# Patient Record
Sex: FEMALE | Race: WHITE | NOT HISPANIC OR LATINO | Employment: FULL TIME | URBAN - METROPOLITAN AREA
[De-identification: names, ages, dates, MRNs, and addresses within clinical notes are randomized per-mention and may not be internally consistent; named-entity substitution may affect disease eponyms.]

---

## 2017-04-05 ENCOUNTER — HISTORICAL (OUTPATIENT)
Dept: RADIOLOGY | Facility: HOSPITAL | Age: 47
End: 2017-04-05

## 2017-11-16 ENCOUNTER — HISTORICAL (OUTPATIENT)
Dept: ADMINISTRATIVE | Facility: HOSPITAL | Age: 47
End: 2017-11-16

## 2017-11-16 LAB
ERYTHROCYTE [DISTWIDTH] IN BLOOD BY AUTOMATED COUNT: 13 % (ref 11.5–17)
HCT VFR BLD AUTO: 40.8 % (ref 37–47)
HGB BLD-MCNC: 12.6 GM/DL (ref 12–16)
MCH RBC QN AUTO: 29.9 PG (ref 27–31)
MCHC RBC AUTO-ENTMCNC: 30.9 GM/DL (ref 33–36)
MCV RBC AUTO: 96.9 FL (ref 80–94)
PLATELET # BLD AUTO: 203 X10(3)/MCL (ref 130–400)
PMV BLD AUTO: 10.3 FL (ref 9.4–12.4)
RBC # BLD AUTO: 4.21 X10(6)/MCL (ref 4.2–5.4)
WBC # SPEC AUTO: 6.8 X10(3)/MCL (ref 4.5–11.5)

## 2018-09-05 LAB — RAPID GROUP A STREP (OHS): NEGATIVE

## 2018-12-05 ENCOUNTER — HISTORICAL (OUTPATIENT)
Dept: RADIOLOGY | Facility: HOSPITAL | Age: 48
End: 2018-12-05

## 2019-06-02 LAB — RAPID GROUP A STREP (OHS): NEGATIVE

## 2019-09-20 LAB — RAPID GROUP A STREP (OHS): NEGATIVE

## 2019-12-09 ENCOUNTER — HISTORICAL (OUTPATIENT)
Dept: RADIOLOGY | Facility: HOSPITAL | Age: 49
End: 2019-12-09

## 2020-12-11 ENCOUNTER — HISTORICAL (OUTPATIENT)
Dept: RADIOLOGY | Facility: HOSPITAL | Age: 50
End: 2020-12-11

## 2021-12-10 LAB
INFLUENZA A ANTIGEN, POC: NEGATIVE
INFLUENZA B ANTIGEN, POC: NEGATIVE
RAPID GROUP A STREP (OHS): NEGATIVE
SARS-COV-2 RNA RESP QL NAA+PROBE: NEGATIVE

## 2022-04-11 ENCOUNTER — HISTORICAL (OUTPATIENT)
Dept: ADMINISTRATIVE | Facility: HOSPITAL | Age: 52
End: 2022-04-11

## 2022-04-24 VITALS
WEIGHT: 203.06 LBS | HEIGHT: 70 IN | OXYGEN SATURATION: 100 % | SYSTOLIC BLOOD PRESSURE: 112 MMHG | BODY MASS INDEX: 29.07 KG/M2 | DIASTOLIC BLOOD PRESSURE: 80 MMHG

## 2022-05-02 ENCOUNTER — HOSPITAL ENCOUNTER (OUTPATIENT)
Dept: RADIOLOGY | Facility: HOSPITAL | Age: 52
Discharge: HOME | End: 2022-05-02
Payer: COMMERCIAL

## 2022-05-02 DIAGNOSIS — Z12.31 ENCOUNTER FOR SCREENING MAMMOGRAM FOR MALIGNANT NEOPLASM OF BREAST: ICD-10-CM

## 2022-05-02 PROCEDURE — 77067 SCR MAMMO BI INCL CAD: CPT | Mod: TC

## 2022-05-02 PROCEDURE — 77063 BREAST TOMOSYNTHESIS BI: CPT | Mod: 26,,, | Performed by: RADIOLOGY

## 2022-05-02 PROCEDURE — 77067 MAMMO DIGITAL SCREENING BILAT WITH TOMO: ICD-10-PCS | Mod: 26,,, | Performed by: RADIOLOGY

## 2022-05-02 PROCEDURE — 77067 SCR MAMMO BI INCL CAD: CPT | Mod: 26,,, | Performed by: RADIOLOGY

## 2022-05-02 PROCEDURE — 77063 MAMMO DIGITAL SCREENING BILAT WITH TOMO: ICD-10-PCS | Mod: 26,,, | Performed by: RADIOLOGY

## 2022-05-19 ENCOUNTER — HOSPITAL ENCOUNTER (OUTPATIENT)
Dept: RADIOLOGY | Facility: HOSPITAL | Age: 52
Discharge: HOME | End: 2022-05-19
Attending: STUDENT IN AN ORGANIZED HEALTH CARE EDUCATION/TRAINING PROGRAM
Payer: COMMERCIAL

## 2022-05-19 DIAGNOSIS — R92.8 ABNORMAL MAMMOGRAM: ICD-10-CM

## 2022-05-19 PROCEDURE — 77065 DX MAMMO INCL CAD UNI: CPT | Mod: 26,RT,, | Performed by: RADIOLOGY

## 2022-05-19 PROCEDURE — 77061 MAMMO DIGITAL DIAGNOSTIC RIGHT WITH TOMO: ICD-10-PCS | Mod: 26,RT,, | Performed by: RADIOLOGY

## 2022-05-19 PROCEDURE — 76642 ULTRASOUND BREAST LIMITED: CPT | Mod: 26,RT,, | Performed by: RADIOLOGY

## 2022-05-19 PROCEDURE — 77061 BREAST TOMOSYNTHESIS UNI: CPT | Mod: 26,RT,, | Performed by: RADIOLOGY

## 2022-05-19 PROCEDURE — 77065 MAMMO DIGITAL DIAGNOSTIC RIGHT WITH TOMO: ICD-10-PCS | Mod: 26,RT,, | Performed by: RADIOLOGY

## 2022-05-19 PROCEDURE — 76642 US BREAST RIGHT LIMITED: ICD-10-PCS | Mod: 26,RT,, | Performed by: RADIOLOGY

## 2022-05-19 PROCEDURE — 76642 ULTRASOUND BREAST LIMITED: CPT | Mod: TC,RT

## 2022-05-19 PROCEDURE — 77065 DX MAMMO INCL CAD UNI: CPT | Mod: TC,RT

## 2022-05-24 ENCOUNTER — OFFICE VISIT (OUTPATIENT)
Dept: URGENT CARE | Facility: CLINIC | Age: 52
End: 2022-05-24
Payer: COMMERCIAL

## 2022-05-24 VITALS
HEART RATE: 97 BPM | RESPIRATION RATE: 18 BRPM | BODY MASS INDEX: 29.62 KG/M2 | WEIGHT: 200 LBS | SYSTOLIC BLOOD PRESSURE: 124 MMHG | OXYGEN SATURATION: 100 % | HEIGHT: 69 IN | DIASTOLIC BLOOD PRESSURE: 87 MMHG | TEMPERATURE: 99 F

## 2022-05-24 DIAGNOSIS — R05.9 COUGH: ICD-10-CM

## 2022-05-24 DIAGNOSIS — J02.9 SORE THROAT: ICD-10-CM

## 2022-05-24 DIAGNOSIS — J06.9 VIRAL URI WITH COUGH: Primary | ICD-10-CM

## 2022-05-24 LAB
CTP QC/QA: YES
FLUAV AG NPH QL: NEGATIVE
FLUBV AG NPH QL: NEGATIVE
S PYO RRNA THROAT QL PROBE: NEGATIVE
SARS-COV-2 RDRP RESP QL NAA+PROBE: NEGATIVE

## 2022-05-24 PROCEDURE — 3079F DIAST BP 80-89 MM HG: CPT | Mod: CPTII,,, | Performed by: FAMILY MEDICINE

## 2022-05-24 PROCEDURE — 3008F BODY MASS INDEX DOCD: CPT | Mod: CPTII,,, | Performed by: FAMILY MEDICINE

## 2022-05-24 PROCEDURE — 3074F SYST BP LT 130 MM HG: CPT | Mod: CPTII,,, | Performed by: FAMILY MEDICINE

## 2022-05-24 PROCEDURE — 3008F PR BODY MASS INDEX (BMI) DOCUMENTED: ICD-10-PCS | Mod: CPTII,,, | Performed by: FAMILY MEDICINE

## 2022-05-24 PROCEDURE — 96372 THER/PROPH/DIAG INJ SC/IM: CPT | Mod: ,,, | Performed by: FAMILY MEDICINE

## 2022-05-24 PROCEDURE — 99213 OFFICE O/P EST LOW 20 MIN: CPT | Mod: 25,,, | Performed by: FAMILY MEDICINE

## 2022-05-24 PROCEDURE — 3079F PR MOST RECENT DIASTOLIC BLOOD PRESSURE 80-89 MM HG: ICD-10-PCS | Mod: CPTII,,, | Performed by: FAMILY MEDICINE

## 2022-05-24 PROCEDURE — 87880 POCT RAPID STREP A: ICD-10-PCS | Mod: QW,,, | Performed by: FAMILY MEDICINE

## 2022-05-24 PROCEDURE — 1160F PR REVIEW ALL MEDS BY PRESCRIBER/CLIN PHARMACIST DOCUMENTED: ICD-10-PCS | Mod: CPTII,,, | Performed by: FAMILY MEDICINE

## 2022-05-24 PROCEDURE — U0002: ICD-10-PCS | Mod: QW,,, | Performed by: FAMILY MEDICINE

## 2022-05-24 PROCEDURE — 1159F PR MEDICATION LIST DOCUMENTED IN MEDICAL RECORD: ICD-10-PCS | Mod: CPTII,,, | Performed by: FAMILY MEDICINE

## 2022-05-24 PROCEDURE — 99213 PR OFFICE/OUTPT VISIT, EST, LEVL III, 20-29 MIN: ICD-10-PCS | Mod: 25,,, | Performed by: FAMILY MEDICINE

## 2022-05-24 PROCEDURE — 87880 STREP A ASSAY W/OPTIC: CPT | Mod: QW,,, | Performed by: FAMILY MEDICINE

## 2022-05-24 PROCEDURE — 3074F PR MOST RECENT SYSTOLIC BLOOD PRESSURE < 130 MM HG: ICD-10-PCS | Mod: CPTII,,, | Performed by: FAMILY MEDICINE

## 2022-05-24 PROCEDURE — U0002 COVID-19 LAB TEST NON-CDC: HCPCS | Mod: QW,,, | Performed by: FAMILY MEDICINE

## 2022-05-24 PROCEDURE — 87804 INFLUENZA ASSAY W/OPTIC: CPT | Mod: QW,,, | Performed by: FAMILY MEDICINE

## 2022-05-24 PROCEDURE — 96372 PR INJECTION,THERAP/PROPH/DIAG2ST, IM OR SUBCUT: ICD-10-PCS | Mod: ,,, | Performed by: FAMILY MEDICINE

## 2022-05-24 PROCEDURE — 1160F RVW MEDS BY RX/DR IN RCRD: CPT | Mod: CPTII,,, | Performed by: FAMILY MEDICINE

## 2022-05-24 PROCEDURE — 87804 POCT INFLUENZA A/B: ICD-10-PCS | Mod: 59,QW,, | Performed by: FAMILY MEDICINE

## 2022-05-24 PROCEDURE — 1159F MED LIST DOCD IN RCRD: CPT | Mod: CPTII,,, | Performed by: FAMILY MEDICINE

## 2022-05-24 RX ORDER — THYROID 60 MG/1
TABLET ORAL
COMMUNITY

## 2022-05-24 RX ORDER — BETAMETHASONE SODIUM PHOSPHATE AND BETAMETHASONE ACETATE 3; 3 MG/ML; MG/ML
6 INJECTION, SUSPENSION INTRA-ARTICULAR; INTRALESIONAL; INTRAMUSCULAR; SOFT TISSUE
Status: COMPLETED | OUTPATIENT
Start: 2022-05-24 | End: 2022-05-24

## 2022-05-24 RX ORDER — DULOXETIN HYDROCHLORIDE 60 MG/1
CAPSULE, DELAYED RELEASE ORAL
COMMUNITY
Start: 2021-12-10 | End: 2023-08-29

## 2022-05-24 RX ADMIN — BETAMETHASONE SODIUM PHOSPHATE AND BETAMETHASONE ACETATE 6 MG: 3; 3 INJECTION, SUSPENSION INTRA-ARTICULAR; INTRALESIONAL; INTRAMUSCULAR; SOFT TISSUE at 07:05

## 2022-05-24 NOTE — PROGRESS NOTES
"Subjective:       Patient ID: Swathi Belle is a 51 y.o. female.    Vitals:  height is 5' 9" (1.753 m) and weight is 90.7 kg (200 lb). Her temperature is 98.7 °F (37.1 °C). Her blood pressure is 124/87 and her pulse is 97. Her respiration is 18 and oxygen saturation is 100%.     Chief Complaint: Fever, Sore Throat, Headache, Cough, and Dizziness    Fever   This is a new problem. The maximum temperature noted was 100 to 100.9 F. Associated symptoms include coughing, headaches and a sore throat. She has tried acetaminophen for the symptoms. The treatment provided mild relief.   Sore Throat   This is a new problem. The current episode started yesterday. The pain is at a severity of 5/10. Associated symptoms include coughing and headaches. She has tried acetaminophen for the symptoms. The treatment provided mild relief.   Headache   This is a new problem. The current episode started yesterday. The pain is at a severity of 7/10. Associated symptoms include coughing, dizziness, a fever and a sore throat.   Cough  The current episode started in the past 7 days. Associated symptoms include a fever, headaches and a sore throat. The treatment provided mild relief.   Dizziness:   Onset:  In the past 7 days   Associated symptoms: a fever and headaches.    HPI  Patient is seen and evaluated in an limited status for concern for COVID-19. In order to decrease the risk of exposure to the hospital and health care workers, only a limited exam was done.   51-year-old present to clinic with concerns of feeling feverish, associated with congestive headache sore throat since 2 days.  Son with similar complaints .  Over-the-counter medications not much help.  Requesting for cortisone shot as it helped in the past   Understands the risk and benefits     Provider Precautions  N95 mask  Gloves    Covid Screening  No confirmation close contact  No travel to high risk area  No recent testing done    Review of Systems  Constitutional " _feverish, body aches, headache  ENMT_sore throat, nasal congestion and postnasal drip  Respiratory_coughing, no shortness of breath or wheezing  Cardiovascular_no chest pain, no palpitations  Gastrointestinal_negative for nausea or vomiting  Integumentary_negative for rash  Objective:      Physical Exam    General : Alert and Oriented, No apparent distress, afebrile  Neck - supple  HENT : Oropharynx no redness or swelling. Tonsils not enlarged. TMs intact mild fluid no redness.   Respiratory : Bilateral equal breath sounds, nonlabored respirations  Cardiovascular : Rate, rhythm regular, normal volume pulse, no murmur  Integumentary : Warm, Dry and no rash  Assessment:       1. Viral URI with cough    2. Sore throat    3. Cough          Plan:       Flu swab negative for influenza, strep test negative, COVID-19 test negative  Adequate hydration and rest.   Warm saltwater gargles for sore throat.   Alternate Tylenol and ibuprofen every 3 hours for fever, body aches and headache.   Claritin 10 mg for nasal congestion.   Robitussin DM for cough and cold medication as needed and as directed.   Return to clinic as needed, call this clinic for any questions  Viral URI with cough  -     betamethasone acetate-betamethasone sodium phosphate injection 6 mg    Sore throat  -     POCT COVID-19 Rapid Screening  -     POCT Influenza A/B  -     POCT rapid strep A    Cough  -     POCT COVID-19 Rapid Screening  -     POCT Influenza A/B  -     POCT rapid strep A

## 2022-09-21 ENCOUNTER — HISTORICAL (OUTPATIENT)
Dept: ADMINISTRATIVE | Facility: HOSPITAL | Age: 52
End: 2022-09-21
Payer: COMMERCIAL

## 2022-09-22 ENCOUNTER — HISTORICAL (OUTPATIENT)
Dept: ADMINISTRATIVE | Facility: HOSPITAL | Age: 52
End: 2022-09-22
Payer: COMMERCIAL

## 2023-01-27 ENCOUNTER — OFFICE VISIT (OUTPATIENT)
Dept: URGENT CARE | Facility: CLINIC | Age: 53
End: 2023-01-27
Payer: COMMERCIAL

## 2023-01-27 VITALS
RESPIRATION RATE: 20 BRPM | DIASTOLIC BLOOD PRESSURE: 86 MMHG | SYSTOLIC BLOOD PRESSURE: 118 MMHG | HEIGHT: 69 IN | OXYGEN SATURATION: 99 % | BODY MASS INDEX: 31.84 KG/M2 | TEMPERATURE: 99 F | WEIGHT: 215 LBS | HEART RATE: 77 BPM

## 2023-01-27 DIAGNOSIS — R52 BODY ACHES: ICD-10-CM

## 2023-01-27 DIAGNOSIS — J02.9 SORE THROAT: ICD-10-CM

## 2023-01-27 DIAGNOSIS — J00 NASOPHARYNGITIS: Primary | ICD-10-CM

## 2023-01-27 LAB
CTP QC/QA: YES
MOLECULAR STREP A: NEGATIVE
POC MOLECULAR INFLUENZA A AGN: NEGATIVE
POC MOLECULAR INFLUENZA B AGN: NEGATIVE
SARS-COV-2 RDRP RESP QL NAA+PROBE: NEGATIVE

## 2023-01-27 PROCEDURE — 1160F RVW MEDS BY RX/DR IN RCRD: CPT | Mod: CPTII,,, | Performed by: FAMILY MEDICINE

## 2023-01-27 PROCEDURE — 1159F PR MEDICATION LIST DOCUMENTED IN MEDICAL RECORD: ICD-10-PCS | Mod: CPTII,,, | Performed by: FAMILY MEDICINE

## 2023-01-27 PROCEDURE — 3079F DIAST BP 80-89 MM HG: CPT | Mod: CPTII,,, | Performed by: FAMILY MEDICINE

## 2023-01-27 PROCEDURE — 3074F SYST BP LT 130 MM HG: CPT | Mod: CPTII,,, | Performed by: FAMILY MEDICINE

## 2023-01-27 PROCEDURE — 99213 OFFICE O/P EST LOW 20 MIN: CPT | Mod: 25,,, | Performed by: FAMILY MEDICINE

## 2023-01-27 PROCEDURE — 3008F PR BODY MASS INDEX (BMI) DOCUMENTED: ICD-10-PCS | Mod: CPTII,,, | Performed by: FAMILY MEDICINE

## 2023-01-27 PROCEDURE — 3074F PR MOST RECENT SYSTOLIC BLOOD PRESSURE < 130 MM HG: ICD-10-PCS | Mod: CPTII,,, | Performed by: FAMILY MEDICINE

## 2023-01-27 PROCEDURE — 87502 POCT INFLUENZA A/B MOLECULAR: ICD-10-PCS | Mod: QW,,, | Performed by: FAMILY MEDICINE

## 2023-01-27 PROCEDURE — 1160F PR REVIEW ALL MEDS BY PRESCRIBER/CLIN PHARMACIST DOCUMENTED: ICD-10-PCS | Mod: CPTII,,, | Performed by: FAMILY MEDICINE

## 2023-01-27 PROCEDURE — 87635: ICD-10-PCS | Mod: QW,,, | Performed by: FAMILY MEDICINE

## 2023-01-27 PROCEDURE — 87651 STREP A DNA AMP PROBE: CPT | Mod: QW,,, | Performed by: FAMILY MEDICINE

## 2023-01-27 PROCEDURE — 87651 POCT STREP A MOLECULAR: ICD-10-PCS | Mod: QW,,, | Performed by: FAMILY MEDICINE

## 2023-01-27 PROCEDURE — 3079F PR MOST RECENT DIASTOLIC BLOOD PRESSURE 80-89 MM HG: ICD-10-PCS | Mod: CPTII,,, | Performed by: FAMILY MEDICINE

## 2023-01-27 PROCEDURE — 1159F MED LIST DOCD IN RCRD: CPT | Mod: CPTII,,, | Performed by: FAMILY MEDICINE

## 2023-01-27 PROCEDURE — 87502 INFLUENZA DNA AMP PROBE: CPT | Mod: QW,,, | Performed by: FAMILY MEDICINE

## 2023-01-27 PROCEDURE — 96372 THER/PROPH/DIAG INJ SC/IM: CPT | Mod: ,,, | Performed by: FAMILY MEDICINE

## 2023-01-27 PROCEDURE — 99213 PR OFFICE/OUTPT VISIT, EST, LEVL III, 20-29 MIN: ICD-10-PCS | Mod: 25,,, | Performed by: FAMILY MEDICINE

## 2023-01-27 PROCEDURE — 3008F BODY MASS INDEX DOCD: CPT | Mod: CPTII,,, | Performed by: FAMILY MEDICINE

## 2023-01-27 PROCEDURE — 96372 PR INJECTION,THERAP/PROPH/DIAG2ST, IM OR SUBCUT: ICD-10-PCS | Mod: ,,, | Performed by: FAMILY MEDICINE

## 2023-01-27 PROCEDURE — 87635 SARS-COV-2 COVID-19 AMP PRB: CPT | Mod: QW,,, | Performed by: FAMILY MEDICINE

## 2023-01-27 RX ORDER — CYCLOSPORINE 0.5 MG/ML
1 EMULSION OPHTHALMIC 2 TIMES DAILY
COMMUNITY
Start: 2023-01-06

## 2023-01-27 RX ORDER — BETAMETHASONE SODIUM PHOSPHATE AND BETAMETHASONE ACETATE 3; 3 MG/ML; MG/ML
6 INJECTION, SUSPENSION INTRA-ARTICULAR; INTRALESIONAL; INTRAMUSCULAR; SOFT TISSUE
Status: COMPLETED | OUTPATIENT
Start: 2023-01-27 | End: 2023-01-27

## 2023-01-27 RX ORDER — FLUOCINOLONE ACETONIDE 0.11 MG/ML
OIL AURICULAR (OTIC)
COMMUNITY
Start: 2022-09-06

## 2023-01-27 RX ADMIN — BETAMETHASONE SODIUM PHOSPHATE AND BETAMETHASONE ACETATE 6 MG: 3; 3 INJECTION, SUSPENSION INTRA-ARTICULAR; INTRALESIONAL; INTRAMUSCULAR; SOFT TISSUE at 01:01

## 2023-01-27 NOTE — PROGRESS NOTES
"Subjective:       Patient ID: Swathi Belle is a 52 y.o. female.    Vitals:  height is 5' 9" (1.753 m) and weight is 97.5 kg (215 lb). Her oral temperature is 98.6 °F (37 °C). Her blood pressure is 118/86 and her pulse is 77. Her respiration is 20 and oxygen saturation is 99%.     Chief Complaint: Sore Throat (Sore throat, body aches, headache, sleeping a lot, x 2 days)    2 days of pharyngitis, fatigue, and cough.  No fever.  No known exposures.       Constitution: Negative for chills, fatigue and fever.   HENT:  Positive for postnasal drip. Negative for congestion, sinus pressure and trouble swallowing.    Neck: Negative for neck pain and neck stiffness.   Cardiovascular:  Negative for chest pain, leg swelling and sob on exertion.   Respiratory:  Positive for cough. Negative for chest tightness, shortness of breath and wheezing.    Neurological:  Negative for dizziness, disorientation and altered mental status.   Psychiatric/Behavioral:  Negative for altered mental status and disorientation.      Objective:      Physical Exam   Constitutional: She is oriented to person, place, and time. She appears well-developed. No distress.   HENT:   Head: Normocephalic.   Ears:   Right Ear: Tympanic membrane and external ear normal.   Left Ear: Tympanic membrane and external ear normal.   Nose: Rhinorrhea present.   Mouth/Throat: Uvula is midline and mucous membranes are normal. No uvula swelling. Cobblestoning present. No oropharyngeal exudate or posterior oropharyngeal edema. Tonsils are 0 on the right. Tonsils are 0 on the left. No tonsillar exudate.   Eyes: Pupils are equal, round, and reactive to light. Right eye exhibits no discharge. Left eye exhibits no discharge.   Neck: Neck supple. No tracheal deviation present.   Cardiovascular: Normal rate, regular rhythm and normal heart sounds.   No murmur heard.  Pulmonary/Chest: Effort normal and breath sounds normal. No stridor. No respiratory distress. She has no " wheezes.   Lymphadenopathy:     She has no cervical adenopathy.   Neurological: no focal deficit. She is alert and oriented to person, place, and time.   Skin: Skin is warm and dry.   Psychiatric: Mood and thought content normal.   Nursing note and vitals reviewed.      Assessment:       1. Nasopharyngitis    2. Sore throat    3. Body aches            Plan:         Nasopharyngitis  -     betamethasone acetate-betamethasone sodium phosphate injection 6 mg    Sore throat  -     POCT COVID-19 Rapid Screening  -     POCT Influenza A/B Molecular  -     POCT Strep A, Molecular    Body aches  -     POCT COVID-19 Rapid Screening  -     POCT Influenza A/B Molecular  -     POCT Strep A, Molecular            Strep, Flu and Covid negative.

## 2023-02-22 DIAGNOSIS — Z12.31 ENCOUNTER FOR SCREENING MAMMOGRAM FOR MALIGNANT NEOPLASM OF BREAST: Primary | ICD-10-CM

## 2023-03-09 ENCOUNTER — OFFICE VISIT (OUTPATIENT)
Dept: URGENT CARE | Facility: CLINIC | Age: 53
End: 2023-03-09
Payer: COMMERCIAL

## 2023-03-09 VITALS
BODY MASS INDEX: 31.84 KG/M2 | WEIGHT: 215 LBS | SYSTOLIC BLOOD PRESSURE: 108 MMHG | RESPIRATION RATE: 18 BRPM | HEIGHT: 69 IN | TEMPERATURE: 100 F | HEART RATE: 85 BPM | OXYGEN SATURATION: 98 % | DIASTOLIC BLOOD PRESSURE: 77 MMHG

## 2023-03-09 DIAGNOSIS — J02.9 SORE THROAT: ICD-10-CM

## 2023-03-09 DIAGNOSIS — J04.2 ACUTE LARYNGOTRACHEITIS: Primary | ICD-10-CM

## 2023-03-09 PROCEDURE — 96372 PR INJECTION,THERAP/PROPH/DIAG2ST, IM OR SUBCUT: ICD-10-PCS | Mod: ,,, | Performed by: FAMILY MEDICINE

## 2023-03-09 PROCEDURE — 87651 STREP A DNA AMP PROBE: CPT | Mod: QW,,, | Performed by: FAMILY MEDICINE

## 2023-03-09 PROCEDURE — 87502 POCT INFLUENZA A/B MOLECULAR: ICD-10-PCS | Mod: QW,,, | Performed by: FAMILY MEDICINE

## 2023-03-09 PROCEDURE — 87502 INFLUENZA DNA AMP PROBE: CPT | Mod: QW,,, | Performed by: FAMILY MEDICINE

## 2023-03-09 PROCEDURE — 99213 PR OFFICE/OUTPT VISIT, EST, LEVL III, 20-29 MIN: ICD-10-PCS | Mod: 25,,, | Performed by: FAMILY MEDICINE

## 2023-03-09 PROCEDURE — 87635: ICD-10-PCS | Mod: QW,,, | Performed by: FAMILY MEDICINE

## 2023-03-09 PROCEDURE — 87635 SARS-COV-2 COVID-19 AMP PRB: CPT | Mod: QW,,, | Performed by: FAMILY MEDICINE

## 2023-03-09 PROCEDURE — 96372 THER/PROPH/DIAG INJ SC/IM: CPT | Mod: ,,, | Performed by: FAMILY MEDICINE

## 2023-03-09 PROCEDURE — 99213 OFFICE O/P EST LOW 20 MIN: CPT | Mod: 25,,, | Performed by: FAMILY MEDICINE

## 2023-03-09 PROCEDURE — 87651 POCT STREP A MOLECULAR: ICD-10-PCS | Mod: QW,,, | Performed by: FAMILY MEDICINE

## 2023-03-09 RX ORDER — AZITHROMYCIN 250 MG/1
TABLET, FILM COATED ORAL
Qty: 6 TABLET | Refills: 0 | Status: SHIPPED | OUTPATIENT
Start: 2023-03-09 | End: 2023-08-29

## 2023-03-09 RX ORDER — DEXAMETHASONE SODIUM PHOSPHATE 100 MG/10ML
5 INJECTION INTRAMUSCULAR; INTRAVENOUS ONCE
Status: COMPLETED | OUTPATIENT
Start: 2023-03-09 | End: 2023-03-09

## 2023-03-09 RX ADMIN — DEXAMETHASONE SODIUM PHOSPHATE 5 MG: 100 INJECTION INTRAMUSCULAR; INTRAVENOUS at 10:03

## 2023-03-09 NOTE — PROGRESS NOTES
"Subjective:       Patient ID: Swathi Belle is a 52 y.o. female.    Vitals:  height is 5' 9" (1.753 m) and weight is 97.5 kg (215 lb). Her temperature is 99.5 °F (37.5 °C). Her blood pressure is 108/77 and her pulse is 85. Her respiration is 18 and oxygen saturation is 98%.     Chief Complaint: Cough (Cough, body aches, chest congestion, loss of appetite, sore throat, fever-101 x Sunday )    HPI:  52-year-old female present to clinic with concerns of body aches, congestion, coughing, sore throat and decreased appetite since 4 days.  T-max at home 101.  Currently on Tylenol ibuprofen some help.  Temp in the clinic 99.5.  No shortness of breath, no wheezing.  No concerns of positive exposure to infections.  Vaccinated for COVID-19.      ROS  :  Constitutional : _ fever , Body aches, Chills  Eyes : _No redness, drainage or pain  HENT_sore throat, postnasal drainage  Respiratory_no wheezing, no shortness of breath  Cardiovascular_no chest pain  Gastrointestinal_ No vomiting, No diarrhea, No abdominal pain  Musculoskeletal_no joint pain, no joint swelling  Integumentary_no skin rash     Objective:      Physical Exam    General : Alert and Oriented, No apparent distress, febrile, a sounds stuffy congested and spastic bronchial coughing  Neck - supple  HENT : Oropharynx no redness or swelling. Tonsils absent. TMs intact mild fluid no redness.   Respiratory : Bilateral equal breath sounds, nonlabored respirations  Cardiovascular : Rate, rhythm regular, normal volume pulse, no murmur  Integumentary : Warm, Dry and no rash    Assessment:       1. Acute laryngotracheitis    2. Sore throat       Plan:     Discussed the physical finding , Condition and course  Encouraged to monitor the symptoms.  Adequate hydration and rest  Tylenol and ibuprofen for fever, body aches and sore throat.   Warm saltwater gargles for sore throat.   Claritin 10 mg or Zyrtec 10 mg for nasal congestion  Robitussin-DM for cough and cold as " needed and as directed  Discussed voiced understanding antibiotics for worsening symptoms and signs of infection discussed in detail voiced understanding  Strep test negative, flu test negative, COVID-19 test negative  Work excuse for today and tomorrow    Acute laryngotracheitis  -     dexAMETHasone injection 5 mg  -     azithromycin (Z-FRANDY) 250 MG tablet; Take 2 tablets by mouth on day 1; Take 1 tablet by mouth on days 2-5  Dispense: 6 tablet; Refill: 0    Sore throat  -     POCT COVID-19 Rapid Screening  -     POCT Influenza A/B MOLECULAR  -     POCT Strep A, Molecular

## 2023-03-09 NOTE — LETTER
March 9, 2023      [facility] [place] [address]  [place] [address]  Phone: [phone]       Patient: Swathi Belle   YOB: 1970  Date of Visit: 03/09/2023    To Whom It May Concern:    Lyly Belle  was at [facility] on 03/09/2023. The patient may return to work/school on 03/13/2023 with no restrictions. If you have any questions or concerns, or if I can be of further assistance, please do not hesitate to contact me.    Sincerely,    Albert Shaffer MA

## 2023-03-09 NOTE — PATIENT INSTRUCTIONS
Discussed the physical finding , Condition and course  Encouraged to monitor the symptoms.  Adequate hydration and rest  Tylenol and ibuprofen for fever, body aches and sore throat.   Warm saltwater gargles for sore throat.   Claritin 10 mg or Zyrtec 10 mg for nasal congestion  Robitussin-DM for cough and cold as needed and as directed  Discussed voiced understanding antibiotics for worsening symptoms and signs of infection discussed in detail voiced understanding  Strep test negative, flu test negative, COVID-19 test negative  Work excuse for today and tomorrow

## 2023-03-12 ENCOUNTER — TELEPHONE (OUTPATIENT)
Dept: URGENT CARE | Facility: CLINIC | Age: 53
End: 2023-03-12
Payer: COMMERCIAL

## 2023-03-12 DIAGNOSIS — R05.1 ACUTE COUGH: Primary | ICD-10-CM

## 2023-03-12 RX ORDER — ALBUTEROL SULFATE 90 UG/1
2 AEROSOL, METERED RESPIRATORY (INHALATION) EVERY 6 HOURS PRN
Qty: 18 G | Refills: 0 | Status: SHIPPED | OUTPATIENT
Start: 2023-03-12 | End: 2024-03-11

## 2023-03-12 NOTE — TELEPHONE ENCOUNTER
Pt. Called asking for update. Provider sent in Inhaler to Pharmacy as well as suggested humidifier and Vicks salve on the chest. Pt. Voiced thanks and understanding.

## 2023-03-12 NOTE — TELEPHONE ENCOUNTER
Pt. Called and said pharmacy needed prior authorization for Ventolin. Called pharmacy and they said they would not need one for Pro air. Provider verbalized authorization. Called pt. Informed her of medication switch. Pt. Verbalized thanks and understanding.

## 2023-04-08 ENCOUNTER — OFFICE VISIT (OUTPATIENT)
Dept: URGENT CARE | Facility: CLINIC | Age: 53
End: 2023-04-08
Payer: COMMERCIAL

## 2023-04-08 VITALS
DIASTOLIC BLOOD PRESSURE: 76 MMHG | TEMPERATURE: 98 F | OXYGEN SATURATION: 99 % | HEIGHT: 69 IN | SYSTOLIC BLOOD PRESSURE: 109 MMHG | RESPIRATION RATE: 20 BRPM | HEART RATE: 73 BPM | WEIGHT: 215 LBS | BODY MASS INDEX: 31.84 KG/M2

## 2023-04-08 DIAGNOSIS — H61.21 IMPACTED CERUMEN OF RIGHT EAR: Primary | ICD-10-CM

## 2023-04-08 PROCEDURE — 69209 EAR CERUMEN REMOVAL: ICD-10-PCS | Mod: S$PBB,RT,,

## 2023-04-08 PROCEDURE — 69209 REMOVE IMPACTED EAR WAX UNI: CPT | Mod: S$PBB,RT,,

## 2023-04-08 PROCEDURE — 99212 PR OFFICE/OUTPT VISIT, EST, LEVL II, 10-19 MIN: ICD-10-PCS | Mod: S$PBB,25,,

## 2023-04-08 PROCEDURE — 99212 OFFICE O/P EST SF 10 MIN: CPT | Mod: S$PBB,25,,

## 2023-04-08 RX ORDER — CYCLOSPORINE 0.5 MG/ML
EMULSION OPHTHALMIC
COMMUNITY

## 2023-04-08 NOTE — PATIENT INSTRUCTIONS
Use OTC Murine or Debrox once every 3 months followed by gentle irrigation with warm water to prevent buildup of ear wax. Return for any worsening pain or other concerns.

## 2023-04-08 NOTE — PROCEDURES
Ear Cerumen Removal    Date/Time: 4/8/2023 9:20 AM  Performed by: Brittani Alexis NP  Authorized by: Brittani Alexis NP     Consent Done?:  Yes (Verbal)    Local anesthetic:  None  Ceruminolytics applied: Ceruminolytics applied prior to the procedure    Medication Used:  Other  Location details:  Right ear  Procedure type: irrigation    Cerumen  Removal Results:  Cerumen completely removed  Patient tolerance:  Patient tolerated the procedure well with no immediate complications     TM visualized and normal after irrigation

## 2023-04-08 NOTE — PROGRESS NOTES
"Subjective:      Patient ID: Swathi Belle is a 52 y.o. female.    Vitals:  height is 5' 9" (1.753 m) and weight is 97.5 kg (215 lb). Her temperature is 97.6 °F (36.4 °C). Her blood pressure is 109/76 and her pulse is 73. Her respiration is 20 and oxygen saturation is 99%.     Chief Complaint: Ear Fullness (Right ear clogged, pain, can't hear, started 2 days ago )    Patient is a 52-year-old female that presents complaining of right ear pain and fullness for the past 2 days.  States feels like she can not hear out of it.    Ear Fullness   Associated symptoms include hearing loss.     HENT:  Positive for ear pain and hearing loss.     Objective:     Physical Exam   Constitutional: She is oriented to person, place, and time.  Non-toxic appearance. She does not appear ill.   HENT:   Ears:   Right Ear: There is cerumen present.   Left Ear: Tympanic membrane, external ear and ear canal normal.   Nose: Right sinus exhibits maxillary sinus tenderness. Left sinus exhibits maxillary sinus tenderness.   Mouth/Throat: Oropharynx is clear and moist and mucous membranes are normal. No tonsillar exudate.   Pulmonary/Chest: Effort normal and breath sounds normal.   Abdominal: Normal appearance and bowel sounds are normal. Soft. There is no abdominal tenderness.   Musculoskeletal: Normal range of motion.         General: Normal range of motion.   Neurological: She is alert and oriented to person, place, and time.   Skin: Skin is warm and dry. Capillary refill takes less than 2 seconds.   Psychiatric: Her behavior is normal. Mood normal.     Assessment:     No diagnosis found.    Plan:       There are no diagnoses linked to this encounter.                "

## 2023-07-12 ENCOUNTER — HOSPITAL ENCOUNTER (OUTPATIENT)
Dept: RADIOLOGY | Facility: HOSPITAL | Age: 53
Discharge: HOME | End: 2023-07-12
Attending: STUDENT IN AN ORGANIZED HEALTH CARE EDUCATION/TRAINING PROGRAM
Payer: COMMERCIAL

## 2023-07-12 DIAGNOSIS — Z12.31 ENCOUNTER FOR SCREENING MAMMOGRAM FOR MALIGNANT NEOPLASM OF BREAST: ICD-10-CM

## 2023-07-12 PROCEDURE — 77063 BREAST TOMOSYNTHESIS BI: CPT | Mod: 26,,, | Performed by: RADIOLOGY

## 2023-07-12 PROCEDURE — 77067 SCR MAMMO BI INCL CAD: CPT | Mod: TC

## 2023-07-12 PROCEDURE — 77067 SCR MAMMO BI INCL CAD: CPT | Mod: 26,,, | Performed by: RADIOLOGY

## 2023-07-12 PROCEDURE — 77063 MAMMO DIGITAL SCREENING BILAT WITH TOMO: ICD-10-PCS | Mod: 26,,, | Performed by: RADIOLOGY

## 2023-07-12 PROCEDURE — 77067 MAMMO DIGITAL SCREENING BILAT WITH TOMO: ICD-10-PCS | Mod: 26,,, | Performed by: RADIOLOGY

## 2023-07-20 ENCOUNTER — HOSPITAL ENCOUNTER (OUTPATIENT)
Dept: RADIOLOGY | Facility: HOSPITAL | Age: 53
Discharge: HOME | End: 2023-07-20
Attending: STUDENT IN AN ORGANIZED HEALTH CARE EDUCATION/TRAINING PROGRAM
Payer: COMMERCIAL

## 2023-07-20 DIAGNOSIS — R92.1 BREAST CALCIFICATIONS: ICD-10-CM

## 2023-07-20 PROCEDURE — 77065 MAMMO DIGITAL DIAGNOSTIC LEFT WITH TOMO: ICD-10-PCS | Mod: 26,LT,, | Performed by: RADIOLOGY

## 2023-07-20 PROCEDURE — 77061 BREAST TOMOSYNTHESIS UNI: CPT | Mod: 26,LT,, | Performed by: RADIOLOGY

## 2023-07-20 PROCEDURE — 77061 MAMMO DIGITAL DIAGNOSTIC LEFT WITH TOMO: ICD-10-PCS | Mod: 26,LT,, | Performed by: RADIOLOGY

## 2023-07-20 PROCEDURE — 77061 BREAST TOMOSYNTHESIS UNI: CPT | Mod: TC,LT

## 2023-07-20 PROCEDURE — 77065 DX MAMMO INCL CAD UNI: CPT | Mod: 26,LT,, | Performed by: RADIOLOGY

## 2023-08-08 ENCOUNTER — HOSPITAL ENCOUNTER (OUTPATIENT)
Dept: RADIOLOGY | Facility: HOSPITAL | Age: 53
Discharge: HOME | End: 2023-08-08
Attending: STUDENT IN AN ORGANIZED HEALTH CARE EDUCATION/TRAINING PROGRAM
Payer: COMMERCIAL

## 2023-08-08 DIAGNOSIS — R92.8 ABNORMAL FINDINGS ON DIAGNOSTIC IMAGING OF BREAST: ICD-10-CM

## 2023-08-08 PROCEDURE — 19081 BX BREAST 1ST LESION STRTCTC: CPT | Mod: LT,,, | Performed by: RADIOLOGY

## 2023-08-08 PROCEDURE — 19081 MAMMO BREAST STEREOTACTIC BIOPSY 1ST SITE COMPLETE: ICD-10-PCS | Mod: LT,,, | Performed by: RADIOLOGY

## 2023-08-08 PROCEDURE — 27201044 MAMMO BREAST BIOPSY WITH IMAGING EA ADD SITE

## 2023-08-08 PROCEDURE — 27201044 MAMMO BREAST STEREOTACTIC BIOPSY 1ST SITE COMPLETE

## 2023-08-08 PROCEDURE — 19082 BX BREAST ADD LESION STRTCTC: CPT | Mod: LT,,, | Performed by: RADIOLOGY

## 2023-08-08 PROCEDURE — 19081 BX BREAST 1ST LESION STRTCTC: CPT

## 2023-08-08 PROCEDURE — 19082 MAMMO BREAST BIOPSY WITH IMAGING EA ADD SITE: ICD-10-PCS | Mod: LT,,, | Performed by: RADIOLOGY

## 2023-08-09 DIAGNOSIS — R92.8 ABNORMAL MAMMOGRAM: Primary | ICD-10-CM

## 2023-08-09 LAB — PSYCHE PATHOLOGY RESULT: NORMAL

## 2023-08-25 NOTE — PROGRESS NOTES
[facility] Breast Surg  Breast Surgical Oncology  New Patient Office Visit - H&P      Referring Provider: Dr. Juan M Dempsey   PCP: Juan M Dempsey MD     Chief Complaint:   Chief Complaint   Patient presents with    Breast Cancer Screening     New patient, high risk atypical lobular hyperplasia tcl% 26.6. Biopsy done 23. Only complaint is residual soreness from biopsy.     Biopsy     Done 23        Subjective:     HPI:  Swathi Belle is a 52 y.o. female who presents on 2023 for evaluation for left atypical lobular hyperplasia. Her lifetime risk for breast cancer based on Tyrer-Cuzick Score v8 is 33.2%.    A detailed patient history was obtained and reviewed. She currently denies any breast issues including rashes, redness, pain, swelling, nipple discharge, or new lumps/masses.    We discussed her post-menopausal symptoms which include: fatigue, hot flashes/night sweats, inability to lose weight, easy bruising, headaches, sensitivity to tempeture changes. She has been put on Testerone injections twice a month and she takes DHEA supplements. She states she does not take estrogen or progesterone.    She was diagnosed with Sjogren's disease in Dec 2022 and also been having some joint pain related to this.     MG breast density: Category B (scattered areas of fibroglandular tissue)     Imagin2023 BL SC MG at [facility]- which revealed calcifications in the L breast anterior depth at 9 o'clock.  No other significant masses, calcifications, or other findings are seen in either breast. BIRADS-0;additional imaging needed     L DG MG at [facility]- which revealed diffuse punctate and amorphous calcifications at the anterior depth, spanning at least 7 cm, with more concentrated groupings of calcifications noted in the central to slightly superior medial left breast anterior depth and the lateral left breast anterior depth.   These calcifications have  developed compared to prior mammography studies. BIRADS-4 suspicious; biopsy recommended.    Pathology:  2023 Stereotactic guided Core Needle Biopsy Left Breast Central to Slightly Superior Medial Calcifications-        - Benign breast tissue with fibrocystic changes and associated microcalcifications    2. 2023 Stereotactic guided Core Needle Biopsy Left Breast Lateral Calcifications-      - Complex fibroadenoma with benign cystic changes and associated microcalcifications       -Atypical lobular hyperplasia, no evidence of malignancy    OB/GYN History:  Age at Menarche Onset: 12  Menopausal Status: perimenopausal, LMP: Patient's last menstrual period was 2023 (approximate).  Hysterectomy/Oophorectomy: NA, at age NA  Hormonal birth control (duration): Yes OCP (estrogen/progesterone).   Pregnancy History:   Age at first live birth: 30  Hormone Replacement Therapy: Yes, Estrogen and Progesterone  Patient did not breast feed.  Patient denies nipple discharge.   Patient denies to previous breast biopsy.   Patient denies to a personal history of breast cancer.    Other Relevant History:  Prior thoracic RT: none  Genetic testing: No  Ashkenazi Restoration descent: No    Family History:  Family History   Problem Relation Age of Onset    No Known Problems Mother     Hypertension Father     Hypertension Brother         Past History:  Past Medical History:   Diagnosis Date    Depression     Sjogren's syndrome     Thyroid disease         Past Surgical History:   Procedure Laterality Date     SECTION      REPAIR OF MENISCUS OF KNEE Left 2023    TUBAL LIGATION          Social History     Socioeconomic History    Marital status:    Tobacco Use    Smoking status: Never    Smokeless tobacco: Never   Substance and Sexual Activity    Alcohol use: Yes     Alcohol/week: 3.0 standard drinks of alcohol     Types: 3 Cans of beer per week     Comment: Occasionally    Drug use: Yes     Types: Marijuana     " Comment: Medical, Gummies    Sexual activity: Yes        Body mass index is 29.92 kg/m².     Allergy/Medications:   Review of patient's allergies indicates:  No Known Allergies       Current Outpatient Medications:     albuterol (VENTOLIN HFA) 90 mcg/actuation inhaler, Inhale 2 puffs into the lungs every 6 (six) hours as needed for Wheezing. Rescue (Patient not taking: Reported on 4/8/2023), Disp: 18 g, Rfl: 0    cycloSPORINE (RESTASIS) 0.05 % ophthalmic emulsion, Restasis 0.05 % eye drops in a dropperette  INSTILL 1 DROP IN BOTH EYES TWICE DAILY, Disp: , Rfl:     fluocinolone acetonide oiL 0.01 % Drop, Place into the right ear., Disp: , Rfl:     RESTASIS 0.05 % ophthalmic emulsion, Place 1 drop into both eyes 2 (two) times daily., Disp: , Rfl:     testosterone cypionate (DEPOTESTOTERONE CYPIONATE) 100 mg/mL injection, 0.3 mLs., Disp: , Rfl:     thyroid, pork, (ARMOUR THYROID) 60 mg Tab, [place] Thyroid 60 mg tablet  TAKE ONE TABLET BY MOUTH EVERY DAY, Disp: , Rfl:        Review of Systems:  Review of Systems   Constitutional:  Positive for malaise/fatigue. Negative for chills, fever and weight loss.   HENT:  Negative for sore throat.    Eyes:  Negative for blurred vision and double vision.   Respiratory:  Negative for cough and shortness of breath.    Cardiovascular:  Negative for chest pain, palpitations and leg swelling.   Gastrointestinal:  Negative for abdominal pain, constipation, diarrhea, heartburn, nausea and vomiting.   Genitourinary:  Negative for dysuria, flank pain, frequency, hematuria and urgency.   Musculoskeletal:  Negative for back pain, joint pain and myalgias.   Neurological:  Negative for dizziness and headaches.   Endo/Heme/Allergies:  Does not bruise/bleed easily.   Psychiatric/Behavioral:  Negative for depression. The patient is not nervous/anxious.           Objective:     Vitals:  Blood pressure 124/85, pulse 66, temperature 97.9 °F (36.6 °C), resp. rate 18, height 5' 9" (1.753 m), weight " 91.9 kg (202 lb 9.6 oz), last menstrual period 02/01/2023, SpO2 100 %.      Physical Exam:  General: The patient is awake, alert and oriented times three. The patient is well nourished and in no acute distress.   Neck: There is no evidence of palpable cervical, supraclavicular or axillary adenopathy. The neck is supple. The thyroid is not enlarged.   Musculoskeletal: The patient has a normal range of motion of her bilateral upper extremities.   Chest: Examination of the chest wall fails to reveal any obvious abnormalities. The lungs are clear to auscultation bilaterally without rales, rhonchi, or wheezing.   Cardiovascular: The heart has a regular rate and rhythm without murmurs, gallops or rubs.  Breast:  Right: Examination of right breast fails to reveal any dominant masses or areas of significant focal nodularity. The nipple is everted without evidence of discharge. There is no skin dimpling with movement of the pectoralis. There are no significant skin changes overlying the breast.   Left: Examination of the left breast fails to reveal any dominant masses or areas of significant focal nodularity. The nipple is everted without evidence of discharge. There is no skin dimpling with movement of the pectoralis. There are no significant skin changes overlying the breast.  Abdomen: The abdomen is soft, flat, nontender and nondistended with no palpable masses or organomegaly.  Integumentary: no rashes or skin lesions present  Neurologic: cranial nerves intact, no signs of peripheral neurological deficit, motor/sensory function intact    Assessment and Discussion:       Encounter Diagnoses   Name Primary?    Atypical lobular hyperplasia (ALH) of left breast Yes    At high risk for breast cancer         The relative risk of developing an invasive cancer in women with LCIS is approximately 7- to 11-fold higher than for women without LCIS. The absolute risk is approximately 1 percent per year and appears to be lifelong.      STRATEGIES OF REDUCING BREAST CANCER RISK  Women with atypical hyperplasia (AH) or lobular carcinoma in situ (LCIS) should be counseled regarding breast cancer risk reduction strategies. Ongoing surveillance with yearly mammography and twice-yearly breast examinations is appropriate. Such women should stop taking oral contraceptives, avoid hormone replacement therapy, and make appropriate lifestyle and dietary changes.    Active surveillance -- Breast cancer surveillance is performed for all women known to be at an increased risk of breast cancer (eg, positive family history of breast cancer, AH, or LCIS), as well as those at population risk. Surveillance must last for the patient's lifetime, or until such time as the patient would not want to undertake treatment for breast cancer if one is found, because the increased risk of breast cancer persists indefinitely. We typically examine high-risk patients at six-month intervals and obtain annual mammograms. Guidelines from the National Comprehensive Cancer Network (NCCN) suggest an interval history and physical examination every 6 to 12 months and annual screening mammography.    Role of breast MRI -- Magnetic resonance imaging (MRI) of the breast is more sensitive than mammography in detecting invasive breast cancers in high-risk women, but it is less specific, especially for younger women. A large number of unnecessary biopsies will be generated while finding cancer in only a small group of patients. Despite that, MRI can detect smaller cancers and more node-negative malignancies in high-risk women than other imaging modalities; however, there is no evidence for a reduction in mortality or improved disease-free survival from screening with MRI.   There are insufficient data to support annual screening with MRI for women who are of an average risk, or an intermediate risk, such as those with a biopsy revealing AH or LCIS.     Consequently, guidelines from major  groups, including the American Cancer Society (ACS) and the NCCN, only integrate breast MRI into their recommendations for breast cancer surveillance in high-risk women (ie, an estimated lifetime risk of breast cancer greater than 20 to 25 percent, calculated using BRCAPRO or a similar model based on family history, rather than the Alyson model).     Risk Reducing Medications -- We offer endocrine therapy as risk reduction medicaton for women treated for high-risk breast lesions. The purpose is to prevent invasive breast cancer; risk reduction medications has not been shown to confer a survival advantage to patients with high-risk lesions. The options for treatment include selective estrogen receptor modulators and aromatase inhibitors. The choice of agents and duration of therapy are discussed separately.       [Source: Uptodate]      We will discuss the potential of risk reduction and how this will impact her HRT.         Plan:       Problem List Items Addressed This Visit          Renal/    Atypical lobular hyperplasia (ALH) of left breast - Primary    Current Assessment & Plan     1. Recommend BL Breast MRI - re: ALH and elevated lifetime risk for breast cancer 26.6%  2. Recommend follow-up after imaging  3. Discussion of risk of reduction  4. Advised to stop estrogen and testerone         Relevant Orders    MRI Breast w/wo Contrast, w/CAD, Bilateral     Other Visit Diagnoses       At high risk for breast cancer        Relevant Orders    MRI Breast w/wo Contrast, w/CAD, Bilateral                All of questions were answered    Amber Galvan MD  Breast Surgical Oncology     OFFICE VISIT CODING:    Non-face-to-face time included:  Yes Preparing to see the patient such as reviewing the patient record  Yes Obtaining and reviewing separately obtained history  Yes Independently interpreting results  Yes Documenting clinical information in electronic health record  No Ordering appropriate medications  Yes Ordering  appropriate tests  Yes Ordering appropriate procedures (including follow-up)  Yes Referring and communicating with other health care professionals (not separately reported)  Yes Care Coordination (not separately reported)    Face-to-face time included:  Yes Performing a medically necessary appropriate history, examination, and/or evaluation  Yes Communicating results to the patient/family/caregiver  Yes Counseling and educating the patient/family/caregiver  Yes Answering patient/family/caregiver questions    Total Time: 45 minutes    Total time includes both face-to-face and non-face-to-face time personally spent by myself on the day of the visit.

## 2023-08-29 ENCOUNTER — OFFICE VISIT (OUTPATIENT)
Dept: SURGERY | Facility: CLINIC | Age: 53
End: 2023-08-29
Payer: COMMERCIAL

## 2023-08-29 VITALS
HEIGHT: 69 IN | RESPIRATION RATE: 18 BRPM | BODY MASS INDEX: 30.01 KG/M2 | TEMPERATURE: 98 F | OXYGEN SATURATION: 100 % | DIASTOLIC BLOOD PRESSURE: 85 MMHG | HEART RATE: 66 BPM | SYSTOLIC BLOOD PRESSURE: 124 MMHG | WEIGHT: 202.63 LBS

## 2023-08-29 DIAGNOSIS — N60.92 ATYPICAL LOBULAR HYPERPLASIA (ALH) OF LEFT BREAST: Primary | ICD-10-CM

## 2023-08-29 DIAGNOSIS — Z91.89 AT HIGH RISK FOR BREAST CANCER: ICD-10-CM

## 2023-08-29 PROCEDURE — 3079F DIAST BP 80-89 MM HG: CPT | Mod: CPTII,S$GLB,, | Performed by: SURGERY

## 2023-08-29 PROCEDURE — 1160F RVW MEDS BY RX/DR IN RCRD: CPT | Mod: CPTII,S$GLB,, | Performed by: SURGERY

## 2023-08-29 PROCEDURE — 1159F MED LIST DOCD IN RCRD: CPT | Mod: CPTII,S$GLB,, | Performed by: SURGERY

## 2023-08-29 PROCEDURE — 3074F SYST BP LT 130 MM HG: CPT | Mod: CPTII,S$GLB,, | Performed by: SURGERY

## 2023-08-29 PROCEDURE — 99204 OFFICE O/P NEW MOD 45 MIN: CPT | Mod: S$GLB,,, | Performed by: SURGERY

## 2023-08-29 PROCEDURE — 3074F PR MOST RECENT SYSTOLIC BLOOD PRESSURE < 130 MM HG: ICD-10-PCS | Mod: CPTII,S$GLB,, | Performed by: SURGERY

## 2023-08-29 PROCEDURE — 99999 PR PBB SHADOW E&M-EST. PATIENT-LVL V: ICD-10-PCS | Mod: PBBFAC,,, | Performed by: SURGERY

## 2023-08-29 PROCEDURE — 1159F PR MEDICATION LIST DOCUMENTED IN MEDICAL RECORD: ICD-10-PCS | Mod: CPTII,S$GLB,, | Performed by: SURGERY

## 2023-08-29 PROCEDURE — 99999 PR PBB SHADOW E&M-EST. PATIENT-LVL V: CPT | Mod: PBBFAC,,, | Performed by: SURGERY

## 2023-08-29 PROCEDURE — 99204 PR OFFICE/OUTPT VISIT, NEW, LEVL IV, 45-59 MIN: ICD-10-PCS | Mod: S$GLB,,, | Performed by: SURGERY

## 2023-08-29 PROCEDURE — 1160F PR REVIEW ALL MEDS BY PRESCRIBER/CLIN PHARMACIST DOCUMENTED: ICD-10-PCS | Mod: CPTII,S$GLB,, | Performed by: SURGERY

## 2023-08-29 PROCEDURE — 3008F BODY MASS INDEX DOCD: CPT | Mod: CPTII,S$GLB,, | Performed by: SURGERY

## 2023-08-29 PROCEDURE — 3008F PR BODY MASS INDEX (BMI) DOCUMENTED: ICD-10-PCS | Mod: CPTII,S$GLB,, | Performed by: SURGERY

## 2023-08-29 PROCEDURE — 3079F PR MOST RECENT DIASTOLIC BLOOD PRESSURE 80-89 MM HG: ICD-10-PCS | Mod: CPTII,S$GLB,, | Performed by: SURGERY

## 2023-08-29 NOTE — ASSESSMENT & PLAN NOTE
1. Recommend BL Breast MRI - re: ALH and elevated lifetime risk for breast cancer 26.6%  2. Recommend follow-up after imaging  3. Discussion of risk of reduction  4. Advised to stop estrogen and testerone

## 2023-08-31 ENCOUNTER — TELEPHONE (OUTPATIENT)
Dept: SURGERY | Facility: CLINIC | Age: 53
End: 2023-08-31
Payer: COMMERCIAL

## 2023-08-31 NOTE — TELEPHONE ENCOUNTER
Breast MRI scheduled for 9/1/2023 at 1300 (arrival at 1230).  Patient notified of this appointment.  Instructed to be NPO for 4 hours prior to imaging.  Patient verbalized understanding.

## 2023-09-01 ENCOUNTER — APPOINTMENT (OUTPATIENT)
Dept: RADIOLOGY | Facility: HOSPITAL | Age: 53
End: 2023-09-01
Attending: SURGERY
Payer: COMMERCIAL

## 2023-09-01 DIAGNOSIS — N60.92 ATYPICAL LOBULAR HYPERPLASIA (ALH) OF LEFT BREAST: ICD-10-CM

## 2023-09-01 DIAGNOSIS — Z91.89 AT HIGH RISK FOR BREAST CANCER: ICD-10-CM

## 2023-09-01 PROCEDURE — 25500020 PHARM REV CODE 255: Performed by: SURGERY

## 2023-09-01 PROCEDURE — 77049 MRI BREAST W/WO CONTRAST, W/CAD, BILATERAL: ICD-10-PCS | Mod: 26,,, | Performed by: STUDENT IN AN ORGANIZED HEALTH CARE EDUCATION/TRAINING PROGRAM

## 2023-09-01 PROCEDURE — A9577 INJ MULTIHANCE: HCPCS | Performed by: SURGERY

## 2023-09-01 PROCEDURE — 77049 MRI BREAST C-+ W/CAD BI: CPT | Mod: 26,,, | Performed by: STUDENT IN AN ORGANIZED HEALTH CARE EDUCATION/TRAINING PROGRAM

## 2023-09-01 PROCEDURE — 77049 MRI BREAST C-+ W/CAD BI: CPT | Mod: TC

## 2023-09-01 RX ADMIN — GADOBENATE DIMEGLUMINE 20 ML: 529 INJECTION, SOLUTION INTRAVENOUS at 01:09

## 2023-09-06 ENCOUNTER — TELEPHONE (OUTPATIENT)
Dept: SURGERY | Facility: CLINIC | Age: 53
End: 2023-09-06
Payer: COMMERCIAL

## 2023-09-06 NOTE — TELEPHONE ENCOUNTER
Patient called with her MRI results.        ----- Message from Amber Galvan MD sent at 9/6/2023  9:47 AM CDT -----  Please call and inform patient her MRI did not reveal any suspicious findings to correlate with the areas of biopsy, which is good. We will discuss plan further at her follow-up on 9/19 @ 10:40 am

## 2023-09-15 NOTE — PROGRESS NOTES
[facility] Breast [facility]  Breast Surgical Oncology  Follow-Up Patient Office Visit       Referring Provider: No ref. provider found  PCP: Justina Willis MD   Medical Oncologist: No care team member to display   Radiation Oncologist: No care team member to display   Other Care Providers:     Chief Complaint:   Chief Complaint   Patient presents with    Follow-up     Follow up (Breast MRI)        Subjective:   Treatment History:  None      Interval History:  2023 - Swathi Belle presents today for follow up after Breast MRI. She has been having increased headaches, tiredness, and hot flashes since she has been off the Testosterone and doesn't feel she can function with these symptoms.    HPI:  Swathi Belle is a 52 y.o. female who presents on 2023 for evaluation for left atypical lobular hyperplasia. Her lifetime risk for breast cancer based on Tyrer-Cuzick Score v8 is 33.2%.     A detailed patient history was obtained and reviewed. She currently denies any breast issues including rashes, redness, pain, swelling, nipple discharge, or new lumps/masses.     We discussed her post-menopausal symptoms which include: fatigue, hot flashes/night sweats, inability to lose weight, easy bruising, headaches, sensitivity to tempeture changes. She has been put on Testerone injections twice a month and she takes DHEA supplements. She states she does not take estrogen or progesterone.     She was diagnosed with Sjogren's disease in Dec 2022 and also been having some joint pain related to this.      MG breast density: Category B (scattered areas of fibroglandular tissue)      Imagin2023 BL SC MG at [facility]- which revealed calcifications in the L breast anterior depth at 9 o'clock.  No other significant masses, calcifications, or other findings are seen in either breast. BIRADS-0;additional imaging needed      L DG MG at [facility]- which revealed diffuse punctate and  amorphous calcifications at the anterior depth, spanning at least 7 cm, with more concentrated groupings of calcifications noted in the central to slightly superior medial left breast anterior depth and the lateral left breast anterior depth.   These calcifications have developed compared to prior mammography studies. BIRADS-4 suspicious; biopsy recommended.     3.   9/1/2023 BL BREAST MRI-         Left Breast- at 1 o'clock posterior depth, there is postsurgical change and a metallic clip (T1-barrel) related to remote prior core needle biopsy in 2015.  No enhancement at this benign core needle biopsy site.  At 3 o'clock, anterior depth, there is postsurgical change and a metallic clip (T1-barrel) related to recent prior high-risk benign core needle biopsy revealing atypical lobular hyperplasia as well as a complex fibroadenoma with benign cystic changes and associated microcalcifications.  There is no abnormal enhancement at the site of the clip or in the surrounding breast tissue to correlate with the punctate and amorphous calcifications in the left breast anterior depth brought to attention on diagnostic mammogram 07/20/2023. At 11 o'clock,  central region anterior depth, there is postsurgical change and a metallic clip (T3-coil) related to recent prior benign core needle biopsy revealing benign breast tissue with fibrocystic changes and associated microcalcifications.  There is no abnormal enhancement at the site of the clip or in the surrounding breast tissue to correlate with the punctate and amorphous calcifications in the left breast anterior depth brought to attention on diagnostic mammogram 07/20/2023.  No suspicious left axillary or internal mammary adenopathy.        Right Breast- No suspicious finding.  No suspicious right axillary or internal mammary adenopathy. BIRADS-2 Benign        Pathology:  8/8/2023 Stereotactic guided Core Needle Biopsy Left Breast Central to Slightly Superior Medial  Calcifications-        - Benign breast tissue with fibrocystic changes and associated microcalcifications     2. 2023 Stereotactic guided Core Needle Biopsy Left Breast Lateral Calcifications-      - Complex fibroadenoma with benign cystic changes and associated microcalcifications       -Atypical lobular hyperplasia, no evidence of malignancy     OB/GYN History:  Age at Menarche Onset: 12  Menopausal Status: perimenopausal, LMP: Patient's last menstrual period was 2023 (approximate).  Hysterectomy/Oophorectomy: NA, at age NA  Hormonal birth control (duration): Yes OCP (estrogen/progesterone).   Pregnancy History:   Age at first live birth: 30  Hormone Replacement Therapy: Yes, Estrogen and Progesterone  Patient did not breast feed.  Patient denies nipple discharge.   Patient denies to previous breast biopsy.   Patient denies to a personal history of breast cancer.     Other Relevant History:  Prior thoracic RT: none  Genetic testing: No  Ashkenazi Cheondoism descent: No     Family History:  Family History   Problem Relation Age of Onset    No Known Problems Mother     Hypertension Father     Hypertension Brother         Past History:  Past Medical History:   Diagnosis Date    Depression     Sjogren's syndrome     Thyroid disease         Past Surgical History:   Procedure Laterality Date     SECTION      REPAIR OF MENISCUS OF KNEE Left 2023    TUBAL LIGATION          Social History     Socioeconomic History    Marital status:    Tobacco Use    Smoking status: Never    Smokeless tobacco: Never   Substance and Sexual Activity    Alcohol use: Yes     Alcohol/week: 3.0 standard drinks of alcohol     Types: 3 Cans of beer per week     Comment: Occasionally    Drug use: Yes     Types: Marijuana     Comment: Medical, Gummies    Sexual activity: Yes        Body mass index is 30.33 kg/m².     Allergy/Medications:   Review of patient's allergies indicates:  No Known Allergies       Current  "Outpatient Medications:     cycloSPORINE (RESTASIS) 0.05 % ophthalmic emulsion, Restasis 0.05 % eye drops in a dropperette  INSTILL 1 DROP IN BOTH EYES TWICE DAILY, Disp: , Rfl:     fluocinolone acetonide oiL 0.01 % Drop, Place into the right ear., Disp: , Rfl:     thyroid, pork, (ARMOUR THYROID) 60 mg Tab, [place] Thyroid 60 mg tablet  TAKE ONE TABLET BY MOUTH EVERY DAY, Disp: , Rfl:     albuterol (VENTOLIN HFA) 90 mcg/actuation inhaler, Inhale 2 puffs into the lungs every 6 (six) hours as needed for Wheezing. Rescue (Patient not taking: Reported on 4/8/2023), Disp: 18 g, Rfl: 0    RESTASIS 0.05 % ophthalmic emulsion, Place 1 drop into both eyes 2 (two) times daily., Disp: , Rfl:     testosterone cypionate (DEPOTESTOTERONE CYPIONATE) 100 mg/mL injection, 0.3 mLs., Disp: , Rfl:        Review of Systems:  Review of Systems   Constitutional:  Positive for malaise/fatigue. Negative for chills, fever and weight loss.        Hot flashes   HENT:  Negative for sore throat.         Headaches   Eyes:  Negative for blurred vision and double vision.   Respiratory:  Negative for cough and shortness of breath.    Cardiovascular:  Negative for chest pain, palpitations and leg swelling.   Gastrointestinal:  Negative for abdominal pain, constipation, diarrhea, heartburn, nausea and vomiting.   Genitourinary:  Negative for dysuria, flank pain, frequency, hematuria and urgency.   Musculoskeletal:  Negative for back pain, joint pain and myalgias.   Neurological:  Negative for dizziness and headaches.   Endo/Heme/Allergies:  Does not bruise/bleed easily.   Psychiatric/Behavioral:  Negative for depression. The patient is not nervous/anxious.            Objective:     Vitals:  Blood pressure 120/81, pulse 66, temperature 98.3 °F (36.8 °C), resp. rate 18, height 5' 9" (1.753 m), weight 93.2 kg (205 lb 6.4 oz), last menstrual period 02/01/2023, SpO2 98 %.      Physical Exam:  General: The patient is awake, alert and oriented times three. " The patient is well nourished and in no acute distress.     No examination performed. Visit for counseling and follow-up after imaging.    Assessment and Plan:     Encounter Diagnoses   Name Primary?    Atypical lobular hyperplasia (ALH) of left breast Yes        The relative risk of developing an invasive cancer in women with atypical lobular hyperplasia/LCIS is approximately 7- to 11-fold higher than for women without these pathologies. The absolute risk is approximately 1 percent per year and appears to be lifelong.     STRATEGIES OF REDUCING BREAST CANCER RISK  Women with atypical hyperplasia (AH) or lobular carcinoma in situ (LCIS) should be counseled regarding breast cancer risk reduction strategies. Ongoing surveillance with yearly mammography and twice-yearly breast examinations is appropriate. Such women should stop taking oral contraceptives, avoid hormone replacement therapy, and make appropriate lifestyle and dietary changes.    Active surveillance -- Breast cancer surveillance is performed for all women known to be at an increased risk of breast cancer (eg, positive family history of breast cancer, AH, or LCIS), as well as those at population risk. Surveillance must last for the patient's lifetime, or until such time as the patient would not want to undertake treatment for breast cancer if one is found, because the increased risk of breast cancer persists indefinitely. We typically examine high-risk patients at six-month intervals and obtain annual mammograms. Guidelines from the National Comprehensive Cancer Network (NCCN) suggest an interval history and physical examination every 6 to 12 months and annual screening mammography.    Role of breast MRI -- Magnetic resonance imaging (MRI) of the breast is more sensitive than mammography in detecting invasive breast cancers in high-risk women, but it is less specific, especially for younger women. A large number of unnecessary biopsies will be generated  while finding cancer in only a small group of patients. Despite that, MRI can detect smaller cancers and more node-negative malignancies in high-risk women than other imaging modalities; however, there is no evidence for a reduction in mortality or improved disease-free survival from screening with MRI.   There are insufficient data to support annual screening with MRI for women who are of an average risk, or an intermediate risk, such as those with a biopsy revealing AH or LCIS.     Consequently, guidelines from major groups, including the American Cancer Society (ACS) and the NCCN, only integrate breast MRI into their recommendations for breast cancer surveillance in high-risk women (ie, an estimated lifetime risk of breast cancer greater than 20 to 25 percent, calculated using BRCAPRO or a similar model based on family history, rather than the Layson model).     Risk Reducing Medications -- We offer endocrine therapy as risk reduction medicaton for women treated for high-risk breast lesions. The purpose is to prevent invasive breast cancer; risk reduction medications has not been shown to confer a survival advantage to patients with high-risk lesions. The options for treatment include selective estrogen receptor modulators and aromatase inhibitors. The choice of agents and duration of therapy are discussed separately.       [Source: Uptodate]    NCCN Information on Risk Reducing Medications         Plan:     Problem List Items Addressed This Visit          Renal/    Atypical lobular hyperplasia (ALH) of left breast - Primary    Current Assessment & Plan     1. Recommend BL SC MG - due July 2024  2. Recommend BL Breast MRI - due Jan 2025  3. Discussed the her being off Testosterone for 2 cycles now and her headaches, low energy, and hot flashes have all returned. She wishes to go back on Testosterone. We had a detailed lengthy discussion about the increased risk. Conflicting information has made managing HRT in  the setting of high risk and breast cancer more difficult. I will provide some additional reading material on the endocrine therapy today.  4. We will need to discuss further and I will provide some research for us both to review at her follow-up visit.  5. Atypical lobular hyperplasia has an increased risk for developing breast cancer in the future and the risk in both breast. The recommendation for risk-reducing medication comes from research studies showing Tamoxifen can lower this risk in patients with atypical hyperplasia but 50-80%.                 All of questions were answered    Amber Galvan MD  Breast Surgical Oncology     OFFICE VISIT CODING:    Non-face-to-face time included:  Yes Preparing to see the patient such as reviewing the patient record  Yes Obtaining and reviewing separately obtained history  Yes Independently interpreting results  Yes Documenting clinical information in electronic health record  No Ordering appropriate medications  Yes Ordering appropriate tests  Yes Ordering appropriate procedures (including follow-up)  Yes Referring and communicating with other health care professionals (not separately reported)  Yes Care Coordination (not separately reported)    Face-to-face time included:  Yes Performing a medically necessary appropriate history, examination, and/or evaluation  Yes Communicating results to the patient/family/caregiver  Yes Counseling and educating the patient/family/caregiver  Yes Answering patient/family/caregiver questions    Total Time: 56 minutes    Total time includes both face-to-face and non-face-to-face time personally spent by myself on the day of the visit.

## 2023-09-19 ENCOUNTER — OFFICE VISIT (OUTPATIENT)
Dept: SURGERY | Facility: CLINIC | Age: 53
End: 2023-09-19
Payer: COMMERCIAL

## 2023-09-19 VITALS
SYSTOLIC BLOOD PRESSURE: 120 MMHG | OXYGEN SATURATION: 98 % | BODY MASS INDEX: 30.42 KG/M2 | DIASTOLIC BLOOD PRESSURE: 81 MMHG | HEIGHT: 69 IN | RESPIRATION RATE: 18 BRPM | TEMPERATURE: 98 F | HEART RATE: 66 BPM | WEIGHT: 205.38 LBS

## 2023-09-19 DIAGNOSIS — N60.92 ATYPICAL LOBULAR HYPERPLASIA (ALH) OF LEFT BREAST: Primary | ICD-10-CM

## 2023-09-19 DIAGNOSIS — Z12.31 ENCOUNTER FOR SCREENING MAMMOGRAM FOR BREAST CANCER: ICD-10-CM

## 2023-09-19 PROCEDURE — 3079F DIAST BP 80-89 MM HG: CPT | Mod: CPTII,S$GLB,, | Performed by: SURGERY

## 2023-09-19 PROCEDURE — 3074F SYST BP LT 130 MM HG: CPT | Mod: CPTII,S$GLB,, | Performed by: SURGERY

## 2023-09-19 PROCEDURE — 3008F PR BODY MASS INDEX (BMI) DOCUMENTED: ICD-10-PCS | Mod: CPTII,S$GLB,, | Performed by: SURGERY

## 2023-09-19 PROCEDURE — 1159F PR MEDICATION LIST DOCUMENTED IN MEDICAL RECORD: ICD-10-PCS | Mod: CPTII,S$GLB,, | Performed by: SURGERY

## 2023-09-19 PROCEDURE — 99215 PR OFFICE/OUTPT VISIT, EST, LEVL V, 40-54 MIN: ICD-10-PCS | Mod: S$GLB,,, | Performed by: SURGERY

## 2023-09-19 PROCEDURE — 1160F RVW MEDS BY RX/DR IN RCRD: CPT | Mod: CPTII,S$GLB,, | Performed by: SURGERY

## 2023-09-19 PROCEDURE — 1160F PR REVIEW ALL MEDS BY PRESCRIBER/CLIN PHARMACIST DOCUMENTED: ICD-10-PCS | Mod: CPTII,S$GLB,, | Performed by: SURGERY

## 2023-09-19 PROCEDURE — 3079F PR MOST RECENT DIASTOLIC BLOOD PRESSURE 80-89 MM HG: ICD-10-PCS | Mod: CPTII,S$GLB,, | Performed by: SURGERY

## 2023-09-19 PROCEDURE — 3074F PR MOST RECENT SYSTOLIC BLOOD PRESSURE < 130 MM HG: ICD-10-PCS | Mod: CPTII,S$GLB,, | Performed by: SURGERY

## 2023-09-19 PROCEDURE — 1159F MED LIST DOCD IN RCRD: CPT | Mod: CPTII,S$GLB,, | Performed by: SURGERY

## 2023-09-19 PROCEDURE — 99215 OFFICE O/P EST HI 40 MIN: CPT | Mod: S$GLB,,, | Performed by: SURGERY

## 2023-09-19 PROCEDURE — 99999 PR PBB SHADOW E&M-EST. PATIENT-LVL IV: ICD-10-PCS | Mod: PBBFAC,,, | Performed by: SURGERY

## 2023-09-19 PROCEDURE — 99999 PR PBB SHADOW E&M-EST. PATIENT-LVL IV: CPT | Mod: PBBFAC,,, | Performed by: SURGERY

## 2023-09-19 PROCEDURE — 3008F BODY MASS INDEX DOCD: CPT | Mod: CPTII,S$GLB,, | Performed by: SURGERY

## 2023-09-19 NOTE — ASSESSMENT & PLAN NOTE
1. Recommend BL SC MG - due July 2024  2. Recommend BL Breast MRI - due Jan 2025  3. Discussed the her being off Testosterone for 2 cycles now and her headaches, low energy, and hot flashes have all returned. She wishes to go back on Testosterone. We had a detailed lengthy discussion about the increased risk. Conflicting information has made managing HRT in the setting of high risk and breast cancer more difficult. I will provide some additional reading material on the endocrine therapy today.  4. We will need to discuss further and I will provide some research for us both to review at her follow-up visit.  5. Atypical lobular hyperplasia has an increased risk for developing breast cancer in the future and the risk in both breast. The recommendation for risk-reducing medication comes from research studies showing Tamoxifen can lower this risk in patients with atypical hyperplasia but 50-80%.

## 2023-09-19 NOTE — LETTER
September 19, 2023        Justina Willis MD  [address]  [address]  [place] [address]             [facility] [place] [facility]  [address]  [place] [address]  Phone: [phone]  Fax: [phone]   Patient: Swathi Belle   MR Number: [id number]   YOB: 1970   Date of Visit: 9/19/2023       Dear Dr. Willis:    Thank you for referring Swathi Belle to me for evaluation. Attached you will find relevant portions of my assessment and plan of care.    If you have questions, please do not hesitate to call me. I look forward to following Swathi Belle along with you.    Sincerely,      Amber Galvan MD            CC    No Recipients    Enclosure

## 2023-09-22 ENCOUNTER — TELEPHONE (OUTPATIENT)
Dept: SURGERY | Facility: CLINIC | Age: 53
End: 2023-09-22
Payer: COMMERCIAL

## 2023-09-22 NOTE — TELEPHONE ENCOUNTER
Patient scheduled for BL SC MG on 7/15/2024 at 0800, and follow up with Dr. Galvan on 7/23/20204 at 1140.  Left message for patient with these appointment dates and time.

## 2024-01-02 ENCOUNTER — OFFICE VISIT (OUTPATIENT)
Dept: URGENT CARE | Facility: CLINIC | Age: 54
End: 2024-01-02
Payer: COMMERCIAL

## 2024-01-02 VITALS
SYSTOLIC BLOOD PRESSURE: 105 MMHG | HEIGHT: 70 IN | TEMPERATURE: 99 F | RESPIRATION RATE: 18 BRPM | OXYGEN SATURATION: 99 % | BODY MASS INDEX: 29.35 KG/M2 | DIASTOLIC BLOOD PRESSURE: 71 MMHG | WEIGHT: 205 LBS | HEART RATE: 73 BPM

## 2024-01-02 DIAGNOSIS — U07.1 COVID-19: Primary | ICD-10-CM

## 2024-01-02 DIAGNOSIS — R05.9 COUGH, UNSPECIFIED TYPE: ICD-10-CM

## 2024-01-02 DIAGNOSIS — R09.81 SINUS CONGESTION: ICD-10-CM

## 2024-01-02 LAB
CTP QC/QA: YES
MOLECULAR STREP A: NEGATIVE
POC MOLECULAR INFLUENZA A AGN: NEGATIVE
POC MOLECULAR INFLUENZA B AGN: NEGATIVE
SARS-COV-2 RDRP RESP QL NAA+PROBE: POSITIVE

## 2024-01-02 PROCEDURE — 87651 STREP A DNA AMP PROBE: CPT | Mod: QW,,, | Performed by: FAMILY MEDICINE

## 2024-01-02 PROCEDURE — 87635 SARS-COV-2 COVID-19 AMP PRB: CPT | Mod: QW,,, | Performed by: FAMILY MEDICINE

## 2024-01-02 PROCEDURE — 87502 INFLUENZA DNA AMP PROBE: CPT | Mod: QW,,, | Performed by: FAMILY MEDICINE

## 2024-01-02 PROCEDURE — 99213 OFFICE O/P EST LOW 20 MIN: CPT | Mod: 25,,, | Performed by: FAMILY MEDICINE

## 2024-01-02 PROCEDURE — 96372 THER/PROPH/DIAG INJ SC/IM: CPT | Mod: ,,, | Performed by: FAMILY MEDICINE

## 2024-01-02 RX ORDER — BENZONATATE 200 MG/1
200 CAPSULE ORAL 3 TIMES DAILY PRN
Qty: 15 CAPSULE | Refills: 0 | Status: SHIPPED | OUTPATIENT
Start: 2024-01-02 | End: 2024-01-07

## 2024-01-02 RX ORDER — DEXAMETHASONE SODIUM PHOSPHATE 100 MG/10ML
6 INJECTION INTRAMUSCULAR; INTRAVENOUS ONCE
Status: COMPLETED | OUTPATIENT
Start: 2024-01-02 | End: 2024-01-02

## 2024-01-02 RX ORDER — VALACYCLOVIR HYDROCHLORIDE 500 MG/1
500 TABLET, FILM COATED ORAL
COMMUNITY
Start: 2023-12-26

## 2024-01-02 RX ADMIN — DEXAMETHASONE SODIUM PHOSPHATE 6 MG: 100 INJECTION INTRAMUSCULAR; INTRAVENOUS at 01:01

## 2024-01-02 NOTE — PROGRESS NOTES
"Subjective:      Patient ID: Swathi Belle is a 53 y.o. female.    Vitals:  height is 5' 10" (1.778 m) and weight is 93 kg (205 lb). Her oral temperature is 98.5 °F (36.9 °C). Her blood pressure is 105/71 and her pulse is 73. Her respiration is 18 and oxygen saturation is 99%.     Chief Complaint: Cough (54 y/o female presents to clinic with c/o coughing,sore throat ,headaches, body aches , nasal congestion started since Friday.)    HPI:  53-year-old female present to clinic with concerns of coughing, congestion, sore throat and body aches associated with stuffiness since 5 days.  Reviewed the vital signs, no fever, O2 sats 99%.  Patient is vaccinated for COVID-19.  Her 2 sons with similar complaints, possible exposure to infections from them.  Never been tested.    ROS :  Constitutional : _ fever , Body aches, Chills  Eyes : _No redness, drainage or pain  HENT_sore throat, postnasal drainage  Respiratory_no wheezing, no shortness of breath  Cardiovascular_no chest pain  Gastrointestinal_ nausea,No vomiting, No diarrhea, No abdominal pain  Musculoskeletal_no joint pain, no joint swelling  Integumentary_no skin rash    Objective:     Physical Exam  General : Alert and Oriented, No apparent distress, afebrile  Neck - supple  HENT : Oropharynx no redness or swelling. Tonsils _. TMs intact mild fluid no redness.   Respiratory : Bilateral equal breath sounds, nonlabored respirations  Cardiovascular : Rate, rhythm regular, normal volume pulse, no murmur  Gastrointestinal: Full abdomen, soft, nontender to palpate  Integumentary : Warm, Dry and no rash    Assessment:     1. COVID-19    2. Cough, unspecified type    3. Sinus congestion      Plan:   With Concern for COVID-19 infection. Swabs obtained today. COVID-19 Test positive  Adequate hydration and rest. Monitor the symptoms closely  Recommendation is to follow a timeline quarantine measures per CDC and [facility]  Tylenol as needed for fever, body aches and headache. " Antihistamine of choice for congestion.   Robitussin-DM for cough and cold as needed and as directed.  Trial of vitamin C, zinc 50 mg, vitamin D3 5000 IU while in quarantine  At home quarantine instructions for 5 days since start of symptoms, no fever (100.4 and above) for 24 hours and with improved symptoms can stop home quarantine  Wear a mask, cover your cough and sneeze. Clean any shared surfaces  Call or return to clinic for any questions. ER precautions with any acute change in symptoms. Follow up with primary MD    Decadron IM today for sinus symptoms, risk and benefits discussed voiced understanding  Flu test negative, strep test negative  Work excuse for 3 days    COVID-19  -     benzonatate (TESSALON) 200 MG capsule; Take 1 capsule (200 mg total) by mouth 3 (three) times daily as needed for Cough.  Dispense: 15 capsule; Refill: 0    Cough, unspecified type  -     POCT COVID-19 Rapid Screening  -     POCT Influenza A/B Molecular  -     POCT Strep A, Molecular    Sinus congestion  -     dexAMETHasone injection 6 mg

## 2024-01-02 NOTE — PATIENT INSTRUCTIONS
With Concern for COVID-19 infection. Swabs obtained today. COVID-19 Test positive  Adequate hydration and rest. Monitor the symptoms closely  Recommendation is to follow a timeline quarantine measures per CDC and LDH  Tylenol as needed for fever, body aches and headache. Antihistamine of choice for congestion.   Robitussin-DM for cough and cold as needed and as directed.  Trial of vitamin C, zinc 50 mg, vitamin D3 5000 IU while in quarantine  At home quarantine instructions for 5 days since start of symptoms, no fever (100.4 and above) for 24 hours and with improved symptoms can stop home quarantine  Wear a mask, cover your cough and sneeze. Clean any shared surfaces  Call or return to clinic for any questions. ER precautions with any acute change in symptoms. Follow up with primary MD    Decadron IM today for sinus symptoms, risk and benefits discussed voiced understanding  Flu test negative, strep test negative  Work excuse for 3 days

## 2024-02-06 ENCOUNTER — HOSPITAL ENCOUNTER (OUTPATIENT)
Dept: RADIOLOGY | Facility: HOSPITAL | Age: 54
Discharge: HOME | End: 2024-02-06
Attending: INTERNAL MEDICINE
Payer: COMMERCIAL

## 2024-02-06 DIAGNOSIS — N95.9 MENOPAUSAL AND PERIMENOPAUSAL DISORDER: ICD-10-CM

## 2024-02-06 PROCEDURE — 77080 DXA BONE DENSITY AXIAL: CPT | Mod: 26,,, | Performed by: RADIOLOGY

## 2024-02-06 PROCEDURE — 77080 DXA BONE DENSITY AXIAL: CPT | Mod: TC

## 2024-03-22 ENCOUNTER — OFFICE VISIT (OUTPATIENT)
Dept: URGENT CARE | Facility: CLINIC | Age: 54
End: 2024-03-22
Payer: COMMERCIAL

## 2024-03-22 VITALS
HEART RATE: 71 BPM | BODY MASS INDEX: 29.63 KG/M2 | OXYGEN SATURATION: 98 % | TEMPERATURE: 98 F | RESPIRATION RATE: 16 BRPM | WEIGHT: 207 LBS | SYSTOLIC BLOOD PRESSURE: 116 MMHG | DIASTOLIC BLOOD PRESSURE: 81 MMHG | HEIGHT: 70 IN

## 2024-03-22 DIAGNOSIS — L25.9 CONTACT DERMATITIS, UNSPECIFIED CONTACT DERMATITIS TYPE, UNSPECIFIED TRIGGER: Primary | ICD-10-CM

## 2024-03-22 PROCEDURE — 99213 OFFICE O/P EST LOW 20 MIN: CPT | Mod: 25,,, | Performed by: NURSE PRACTITIONER

## 2024-03-22 PROCEDURE — 96372 THER/PROPH/DIAG INJ SC/IM: CPT | Mod: ,,, | Performed by: NURSE PRACTITIONER

## 2024-03-22 RX ORDER — PREDNISONE 20 MG/1
TABLET ORAL
Qty: 15 TABLET | Refills: 0 | Status: SHIPPED | OUTPATIENT
Start: 2024-03-22

## 2024-03-22 RX ORDER — BETAMETHASONE SODIUM PHOSPHATE AND BETAMETHASONE ACETATE 3; 3 MG/ML; MG/ML
9 INJECTION, SUSPENSION INTRA-ARTICULAR; INTRALESIONAL; INTRAMUSCULAR; SOFT TISSUE
Status: COMPLETED | OUTPATIENT
Start: 2024-03-22 | End: 2024-03-22

## 2024-03-22 RX ORDER — BETAMETHASONE VALERATE 1 MG/G
CREAM TOPICAL 2 TIMES DAILY
Qty: 45 G | Refills: 0 | Status: SHIPPED | OUTPATIENT
Start: 2024-03-22 | End: 2024-03-29

## 2024-03-22 RX ADMIN — BETAMETHASONE SODIUM PHOSPHATE AND BETAMETHASONE ACETATE 9 MG: 3; 3 INJECTION, SUSPENSION INTRA-ARTICULAR; INTRALESIONAL; INTRAMUSCULAR; SOFT TISSUE at 04:03

## 2024-03-22 NOTE — PROGRESS NOTES
"Subjective:      Patient ID: Swathi Belle is a 53 y.o. female.    Vitals:  height is 5' 10" (1.778 m) and weight is 93.9 kg (207 lb). Her temperature is 97.6 °F (36.4 °C). Her blood pressure is 116/81 and her pulse is 71. Her respiration is 16 and oxygen saturation is 98%.     Chief Complaint: Rash (Possible poison ivy -- skin rash all over body including face around left eye, itchiness x Monday. Tried applying Cortisone cream, tea tree. )    53-year-old female presents to urgent care with a week-long history poison ivy induced contact dermatitis to bilateral forearms and now spreading to left-sided cheek and left eyelid along with 1-2 areas on mid lower back area.  States using tea tree oil to help with itching but areas still spreading.  Denies any shortness a breath or other issues today.      Skin:  Positive for rash.      Objective:     Physical Exam   Constitutional: She is oriented to person, place, and time. She appears well-developed. She is cooperative.  Non-toxic appearance. She does not appear ill. No distress.   HENT:   Head: Normocephalic and atraumatic.   Ears:   Right Ear: Hearing, tympanic membrane, external ear and ear canal normal.   Left Ear: Hearing, tympanic membrane, external ear and ear canal normal.   Nose: Nose normal. No mucosal edema, rhinorrhea or nasal deformity. No epistaxis. Right sinus exhibits no maxillary sinus tenderness and no frontal sinus tenderness. Left sinus exhibits no maxillary sinus tenderness and no frontal sinus tenderness.   Mouth/Throat: Uvula is midline, oropharynx is clear and moist and mucous membranes are normal. No trismus in the jaw. Normal dentition. No uvula swelling. No oropharyngeal exudate, posterior oropharyngeal edema or posterior oropharyngeal erythema.   Eyes: Conjunctivae and lids are normal. No scleral icterus.   Neck: Trachea normal and phonation normal. Neck supple. No edema present. No erythema present. No neck rigidity present. "   Cardiovascular: Normal rate, regular rhythm, normal heart sounds and normal pulses.   Pulmonary/Chest: Effort normal and breath sounds normal. No respiratory distress. She has no decreased breath sounds. She has no rhonchi.   Abdominal: Normal appearance.   Musculoskeletal: Normal range of motion.         General: No deformity. Normal range of motion.   Neurological: She is alert and oriented to person, place, and time. She exhibits normal muscle tone. Coordination normal.   Skin: Skin is warm, dry, intact, not diaphoretic and not pale.         Comments: Raised and slightly erythematous rash like areas noted to bilateral inner forearms hands and also left eyebrow.   Psychiatric: Her speech is normal and behavior is normal. Judgment and thought content normal.   Nursing note and vitals reviewed.      Assessment:     1. Contact dermatitis, unspecified contact dermatitis type, unspecified trigger        Plan:       Contact dermatitis, unspecified contact dermatitis type, unspecified trigger    Other orders  -     predniSONE (DELTASONE) 20 MG tablet; Take 2 tabs p.o. q.d. x5 days then 1 tab p.o. q.d. x5 days  Dispense: 15 tablet; Refill: 0  -     betamethasone valerate 0.1% (VALISONE) 0.1 % Crea; Apply topically 2 (two) times daily. for 7 days  Dispense: 45 g; Refill: 0  -     betamethasone acetate-betamethasone sodium phosphate injection 9 mg

## 2024-03-22 NOTE — PATIENT INSTRUCTIONS
Begin taking oral steroids tomorrow with food and take these as directed until completion  Used steroid cream to affected areas except near eye.  You may use Zyrtec daily for itching along with Benadryl at night   Monitor for any worsening signs or symptoms or nonresolution of rash like areas if this does occur please be re-evaluated.

## 2024-07-15 ENCOUNTER — HOSPITAL ENCOUNTER (OUTPATIENT)
Dept: RADIOLOGY | Facility: HOSPITAL | Age: 54
Discharge: HOME | End: 2024-07-15
Attending: SURGERY
Payer: COMMERCIAL

## 2024-07-15 DIAGNOSIS — Z12.31 ENCOUNTER FOR SCREENING MAMMOGRAM FOR BREAST CANCER: ICD-10-CM

## 2024-07-15 PROCEDURE — 77063 BREAST TOMOSYNTHESIS BI: CPT | Mod: TC

## 2024-07-15 PROCEDURE — 77067 SCR MAMMO BI INCL CAD: CPT | Mod: 26,,, | Performed by: RADIOLOGY

## 2024-07-15 PROCEDURE — 77063 BREAST TOMOSYNTHESIS BI: CPT | Mod: 26,,, | Performed by: RADIOLOGY

## 2024-07-23 ENCOUNTER — OFFICE VISIT (OUTPATIENT)
Dept: SURGERY | Facility: CLINIC | Age: 54
End: 2024-07-23
Payer: COMMERCIAL

## 2024-07-23 VITALS
WEIGHT: 216.38 LBS | RESPIRATION RATE: 18 BRPM | TEMPERATURE: 98 F | DIASTOLIC BLOOD PRESSURE: 74 MMHG | HEIGHT: 70 IN | OXYGEN SATURATION: 97 % | HEART RATE: 71 BPM | BODY MASS INDEX: 30.98 KG/M2 | SYSTOLIC BLOOD PRESSURE: 109 MMHG

## 2024-07-23 DIAGNOSIS — N60.92 ATYPICAL LOBULAR HYPERPLASIA (ALH) OF LEFT BREAST: Primary | ICD-10-CM

## 2024-07-23 DIAGNOSIS — Z91.89 AT HIGH RISK FOR BREAST CANCER: ICD-10-CM

## 2024-07-23 PROCEDURE — 3074F SYST BP LT 130 MM HG: CPT | Mod: CPTII,S$GLB,, | Performed by: SURGERY

## 2024-07-23 PROCEDURE — 3008F BODY MASS INDEX DOCD: CPT | Mod: CPTII,S$GLB,, | Performed by: SURGERY

## 2024-07-23 PROCEDURE — 3078F DIAST BP <80 MM HG: CPT | Mod: CPTII,S$GLB,, | Performed by: SURGERY

## 2024-07-23 PROCEDURE — 99214 OFFICE O/P EST MOD 30 MIN: CPT | Mod: S$GLB,,, | Performed by: SURGERY

## 2024-07-23 PROCEDURE — 99999 PR PBB SHADOW E&M-EST. PATIENT-LVL IV: CPT | Mod: PBBFAC,,, | Performed by: SURGERY

## 2024-07-23 PROCEDURE — 1159F MED LIST DOCD IN RCRD: CPT | Mod: CPTII,S$GLB,, | Performed by: SURGERY

## 2024-07-23 RX ORDER — MELOXICAM 7.5 MG/1
7.5 TABLET ORAL
COMMUNITY
Start: 2024-04-01

## 2024-07-23 RX ORDER — CHOLECALCIFEROL (VITAMIN D3) 125 MCG
CAPSULE ORAL
COMMUNITY

## 2024-07-23 RX ORDER — PREDNISONE 5 MG/1
TABLET ORAL
COMMUNITY
Start: 2024-07-11

## 2024-07-24 PROBLEM — Z91.89 AT HIGH RISK FOR BREAST CANCER: Status: ACTIVE | Noted: 2024-07-24

## 2024-07-24 NOTE — ASSESSMENT & PLAN NOTE
1. Recommend BL SC MG - due July 2025  2. Recommend BL Breast MRI - due Jan 2025  3. Discussed the her being off Testosterone - she has remained off Testerone. She has been doing well. We will plan to not continue.  4. Will not prescribe risk reducing medications at this time and continue high-risk screening.  5. Clinical exam following imaging in Jan 2025

## 2024-11-19 ENCOUNTER — OFFICE VISIT (OUTPATIENT)
Dept: URGENT CARE | Facility: CLINIC | Age: 54
End: 2024-11-19
Payer: COMMERCIAL

## 2024-11-19 VITALS
OXYGEN SATURATION: 98 % | HEART RATE: 68 BPM | RESPIRATION RATE: 18 BRPM | HEIGHT: 70 IN | WEIGHT: 210 LBS | TEMPERATURE: 98 F | BODY MASS INDEX: 30.06 KG/M2 | SYSTOLIC BLOOD PRESSURE: 111 MMHG | DIASTOLIC BLOOD PRESSURE: 75 MMHG

## 2024-11-19 DIAGNOSIS — R05.9 COUGH, UNSPECIFIED TYPE: Primary | ICD-10-CM

## 2024-11-19 DIAGNOSIS — R09.82 POSTNASAL DRIP: ICD-10-CM

## 2024-11-19 LAB
CTP QC/QA: YES
CTP QC/QA: YES
MOLECULAR STREP A: NEGATIVE
POC MOLECULAR INFLUENZA A AGN: NEGATIVE
POC MOLECULAR INFLUENZA B AGN: NEGATIVE

## 2024-11-19 PROCEDURE — 99213 OFFICE O/P EST LOW 20 MIN: CPT | Mod: 25,,, | Performed by: FAMILY MEDICINE

## 2024-11-19 PROCEDURE — 96372 THER/PROPH/DIAG INJ SC/IM: CPT | Mod: ,,, | Performed by: FAMILY MEDICINE

## 2024-11-19 PROCEDURE — 87651 STREP A DNA AMP PROBE: CPT | Mod: QW,,, | Performed by: FAMILY MEDICINE

## 2024-11-19 PROCEDURE — 87502 INFLUENZA DNA AMP PROBE: CPT | Mod: QW,,, | Performed by: FAMILY MEDICINE

## 2024-11-19 RX ORDER — DOXYCYCLINE 100 MG/1
100 CAPSULE ORAL 2 TIMES DAILY
Qty: 14 CAPSULE | Refills: 0 | Status: SHIPPED | OUTPATIENT
Start: 2024-11-19 | End: 2024-11-26

## 2024-11-19 RX ORDER — PREDNISONE 10 MG/1
30 TABLET ORAL DAILY
Qty: 9 TABLET | Refills: 0 | Status: SHIPPED | OUTPATIENT
Start: 2024-11-19 | End: 2024-11-22

## 2024-11-19 RX ORDER — DEXAMETHASONE SODIUM PHOSPHATE 10 MG/ML
10 INJECTION INTRAMUSCULAR; INTRAVENOUS
Status: COMPLETED | OUTPATIENT
Start: 2024-11-19 | End: 2024-11-19

## 2024-11-19 RX ORDER — PROMETHAZINE HYDROCHLORIDE AND DEXTROMETHORPHAN HYDROBROMIDE 6.25; 15 MG/5ML; MG/5ML
5 SYRUP ORAL EVERY 6 HOURS PRN
Qty: 120 ML | Refills: 0 | Status: SHIPPED | OUTPATIENT
Start: 2024-11-19 | End: 2024-11-25

## 2024-11-19 RX ADMIN — DEXAMETHASONE SODIUM PHOSPHATE 10 MG: 10 INJECTION INTRAMUSCULAR; INTRAVENOUS at 11:11

## 2024-11-19 NOTE — PROGRESS NOTES
"Subjective:      Patient ID: Swathi Belle is a 54 y.o. female.    Vitals:  height is 5' 10" (1.778 m) and weight is 95.3 kg (210 lb). Her oral temperature is 98 °F (36.7 °C). Her blood pressure is 111/75 and her pulse is 68. Her respiration is 18 and oxygen saturation is 98%.     Chief Complaint: Cough     Patient is a 54 y.o. female who presents to urgent care with complaints of sore throat, dry nagging cough, chills, body aches x 5 days. Alleviating factors include Tylenol and warm tea with mild amount of relief. Patient denies shortness a breath wheezing, sinus pressure nausea, vomiting, diarrhea.      Cough      Constitution: Negative.   HENT: Negative.     Cardiovascular: Negative.    Eyes: Negative.    Respiratory:  Positive for cough.    Gastrointestinal: Negative.    Genitourinary: Negative.    Musculoskeletal: Negative.    Skin: Negative.    Allergic/Immunologic: Negative.    Neurological: Negative.    Hematologic/Lymphatic: Negative.       Objective:     Physical Exam   Constitutional: She is oriented to person, place, and time. She appears well-developed. She is cooperative.  Non-toxic appearance. She does not appear ill. No distress.   HENT:   Head: Normocephalic and atraumatic.   Ears:   Right Ear: Hearing and external ear normal.   Left Ear: Hearing and external ear normal.   Mouth/Throat: Oropharynx is clear and moist and mucous membranes are normal. No oropharyngeal exudate or posterior oropharyngeal erythema (postnasal drip).   Eyes: Conjunctivae and lids are normal.   Neck: Trachea normal and phonation normal. Neck supple. No edema present. No erythema present. No neck rigidity present.   Cardiovascular: Normal rate.   Pulmonary/Chest: Effort normal and breath sounds normal. No stridor. No respiratory distress. She has no decreased breath sounds. She has no wheezes. She has no rhonchi. She has no rales.   Abdominal: Normal appearance.   Lymphadenopathy:     She has no cervical adenopathy. "   Neurological: She is alert and oriented to person, place, and time. She exhibits normal muscle tone. Coordination normal.   Skin: Skin is warm, dry, intact, not diaphoretic and no rash.   Psychiatric: Her speech is normal and behavior is normal. Mood, judgment and thought content normal.   Nursing note and vitals reviewed.         Previous History      Review of patient's allergies indicates:  No Known Allergies    Past Medical History:   Diagnosis Date    Cyst of bone of left hand     Middle Finger    Depression     Rheumatoid arthritis, unspecified     Sjogren's syndrome     Thyroid disease      Current Outpatient Medications   Medication Instructions    albuterol (VENTOLIN HFA) 90 mcg/actuation inhaler 2 puffs, Inhalation, Every 6 hours PRN, Rescue    betamethasone valerate 0.1% (VALISONE) 0.1 % Crea Topical (Top), 2 times daily    cholecalciferol, vitamin D3, 125 mcg (5,000 unit) capsule as directed Orally    cycloSPORINE (RESTASIS) 0.05 % ophthalmic emulsion Restasis 0.05 % eye drops in a dropperette   INSTILL 1 DROP IN BOTH EYES TWICE DAILY    doxycycline (MONODOX) 100 mg, Oral, 2 times daily    fluocinolone acetonide oiL 0.01 % Drop Place into the right ear.    hydrOXYchloroQUINE (PLAQUENIL) 25 mg/mL suspension 2 times daily    meloxicam (MOBIC) 7.5 mg    mv,calcium,min/iron/folic/vitK (MULTI FOR HER ORAL) as directed Orally    naltrexone 1 mg tablet Oral, Nightly    predniSONE (DELTASONE) 5 MG tablet 3 tablets with food or milk Orally Once a day x 1 wk, then take 2 tablets Q D x 1wk, then take 1 and 1/2 tablet Q D thereafter for 30 day(s)    predniSONE (DELTASONE) 30 mg, Oral, Daily    promethazine-dextromethorphan (PROMETHAZINE-DM) 6.25-15 mg/5 mL Syrp 5 mLs, Oral, Every 6 hours PRN    RESTASIS 0.05 % ophthalmic emulsion 1 drop, 2 times daily    testosterone cypionate (DEPOTESTOTERONE CYPIONATE) 100 mg/mL injection 0.3 mLs    thyroid, pork, (ARMOUR THYROID) 60 mg Tab Golden Thyroid 60 mg tablet   TAKE ONE  "TABLET BY MOUTH EVERY DAY    valACYclovir (VALTREX) 500 mg     Past Surgical History:   Procedure Laterality Date     SECTION      REPAIR OF MENISCUS OF KNEE Left 2023    TUBAL LIGATION       Family History   Problem Relation Name Age of Onset    No Known Problems Mother      Hypertension Father      Hypertension Brother         Social History     Tobacco Use    Smoking status: Never     Passive exposure: Never    Smokeless tobacco: Never   Substance Use Topics    Alcohol use: Yes     Alcohol/week: 3.0 standard drinks of alcohol     Types: 3 Cans of beer per week     Comment: Occasionally    Drug use: Yes     Types: Marijuana     Comment: Medical, Gummies        Physical Exam      Vital Signs Reviewed   /75   Pulse 68   Temp 98 °F (36.7 °C) (Oral)   Resp 18   Ht 5' 10" (1.778 m)   Wt 95.3 kg (210 lb)   LMP 2023 (Approximate)   SpO2 98%   BMI 30.13 kg/m²        Procedures    Procedures     Labs     Results for orders placed or performed in visit on 24   POCT Strep A, Molecular    Collection Time: 24 11:45 AM   Result Value Ref Range    Molecular Strep A, POC Negative Negative     Acceptable Yes    POCT Influenza A/B MOLECULAR    Collection Time: 24 11:45 AM   Result Value Ref Range    POC Molecular Influenza A Ag Negative Negative    POC Molecular Influenza B Ag Negative Negative     Acceptable Yes        Assessment:     1. Cough, unspecified type    2. Postnasal drip        Plan:   Negative strep, negative flu  Likely a viral infection or possibly allergies causing the postnasal drip triggering your cough  Medications sent to pharmacy  Start oral steroids tomorrow  Hold antibiotics for 2-3 days and start if symptoms persist  Cough syrup may cause drowsiness.  Do not drink alcohol or drive when taking it  Start taking an allergy pill daily such as claritin, zyrtec, allegrea or xyzal. Also start using a nasal steroid spray such as " flonase or nasacort daily. If you are not being treated for high blood pressure, you can also take decongestant such as sudafed as needed. They can be purchased over the counter. If oral steroids were prescribed, start them tomorrow morning. Monitor for fever. Take tylenol/acetaminophen or ibuprofen as needed. Rest and hydrate. If symptoms persist or worsen, return to clinic or seek medical attention immediately.       Cough, unspecified type  -     POCT Strep A, Molecular  -     POCT Influenza A/B MOLECULAR    Postnasal drip    Other orders  -     dexAMETHasone injection 10 mg  -     predniSONE (DELTASONE) 10 MG tablet; Take 3 tablets (30 mg total) by mouth once daily. for 3 days  Dispense: 9 tablet; Refill: 0  -     doxycycline (MONODOX) 100 MG capsule; Take 1 capsule (100 mg total) by mouth 2 (two) times daily. for 7 days  Dispense: 14 capsule; Refill: 0  -     promethazine-dextromethorphan (PROMETHAZINE-DM) 6.25-15 mg/5 mL Syrp; Take 5 mLs by mouth every 6 (six) hours as needed (cough).  Dispense: 120 mL; Refill: 0

## 2024-11-19 NOTE — PATIENT INSTRUCTIONS
Plan:   Negative strep, negative flu  Likely a viral infection or possibly allergies causing the postnasal drip triggering your cough  Medications sent to pharmacy  Start oral steroids tomorrow  Hold antibiotics for 2-3 days and start if symptoms persist  Cough syrup may cause drowsiness.  Do not drink alcohol or drive when taking it  Start taking an allergy pill daily such as claritin, zyrtec, allegrea or xyzal. Also start using a nasal steroid spray such as flonase or nasacort daily. If you are not being treated for high blood pressure, you can also take decongestant such as sudafed as needed. They can be purchased over the counter. If oral steroids were prescribed, start them tomorrow morning. Monitor for fever. Take tylenol/acetaminophen or ibuprofen as needed. Rest and hydrate. If symptoms persist or worsen, return to clinic or seek medical attention immediately.

## 2025-02-11 ENCOUNTER — APPOINTMENT (OUTPATIENT)
Dept: RADIOLOGY | Facility: HOSPITAL | Age: 55
End: 2025-02-11
Attending: SURGERY
Payer: COMMERCIAL

## 2025-02-11 DIAGNOSIS — N60.92 ATYPICAL LOBULAR HYPERPLASIA (ALH) OF LEFT BREAST: ICD-10-CM

## 2025-02-11 DIAGNOSIS — Z91.89 AT HIGH RISK FOR BREAST CANCER: ICD-10-CM

## 2025-02-11 PROCEDURE — 77049 MRI BREAST C-+ W/CAD BI: CPT | Mod: TC

## 2025-02-11 PROCEDURE — A9577 INJ MULTIHANCE: HCPCS | Performed by: SURGERY

## 2025-02-11 PROCEDURE — 25500020 PHARM REV CODE 255: Performed by: SURGERY

## 2025-02-11 RX ADMIN — GADOBENATE DIMEGLUMINE 19 ML: 529 INJECTION, SOLUTION INTRAVENOUS at 02:02

## 2025-02-24 NOTE — PROGRESS NOTES
[facility] Breast Surg  Breast Surgical Oncology  Follow-Up Patient Office Visit       Referring Provider: No ref. provider found  PCP: Justina Willis MD   Medical Oncologist: No care team member to display   Radiation Oncologist: No care team member to display   Other Care Providers:     Chief Complaint:   Chief Complaint   Patient presents with    Follow-up     Patient reports no breast related concerns         Subjective:   Treatment History:  None      Interval History:  2025 - Swathi Belle presents today for follow up for high risk and atypical lobular hyperplasia.     HPI:  Swathi Belle is a 52 y.o. female who presents on 2023 for evaluation for left atypical lobular hyperplasia. Her lifetime risk for breast cancer based on Tyrer-Cuzick Score v8 is 33.2%.     A detailed patient history was obtained and reviewed. She currently denies any breast issues including rashes, redness, pain, swelling, nipple discharge, or new lumps/masses.     We discussed her post-menopausal symptoms which include: fatigue, hot flashes/night sweats, inability to lose weight, easy bruising, headaches, sensitivity to tempeture changes. She has been put on Testerone injections twice a month and she takes DHEA supplements. She states she does not take estrogen or progesterone.     She was diagnosed with Sjogren's disease in Dec 2022 and also been having some joint pain related to this.      MG breast density: Category B (scattered areas of fibroglandular tissue)      Imagin2023 BL SC MG at [facility]- which revealed calcifications in the L breast anterior depth at 9 o'clock.  No other significant masses, calcifications, or other findings are seen in either breast. BIRADS-0;additional imaging needed      L DG MG at [facility]- which revealed diffuse punctate and amorphous calcifications at the anterior depth, spanning at least 7 cm, with more concentrated groupings of  calcifications noted in the central to slightly superior medial left breast anterior depth and the lateral left breast anterior depth.   These calcifications have developed compared to prior mammography studies. BIRADS-4 suspicious; biopsy recommended.     3.   9/1/2023 BL BREAST MRI-         Left Breast- at 1 o'clock posterior depth, there is postsurgical change and a metallic clip (T1-barrel) related to remote prior core needle biopsy in 2015.  No enhancement at this benign core needle biopsy site.  At 3 o'clock, anterior depth, there is postsurgical change and a metallic clip (T1-barrel) related to recent prior high-risk benign core needle biopsy revealing atypical lobular hyperplasia as well as a complex fibroadenoma with benign cystic changes and associated microcalcifications.  There is no abnormal enhancement at the site of the clip or in the surrounding breast tissue to correlate with the punctate and amorphous calcifications in the left breast anterior depth brought to attention on diagnostic mammogram 07/20/2023. At 11 o'clock,  central region anterior depth, there is postsurgical change and a metallic clip (T3-coil) related to recent prior benign core needle biopsy revealing benign breast tissue with fibrocystic changes and associated microcalcifications.  There is no abnormal enhancement at the site of the clip or in the surrounding breast tissue to correlate with the punctate and amorphous calcifications in the left breast anterior depth brought to attention on diagnostic mammogram 07/20/2023.  No suspicious left axillary or internal mammary adenopathy.        Right Breast- No suspicious finding.  No suspicious right axillary or internal mammary adenopathy. BIRADS-2 Benign     4. 7/17/2024  BL SC MG at [facility]- which revealed no evidence of malignancy. BIRADS-2 Benign    5. 2/15/2025 BL BREAST MRI which revealed no MRI evidence of malignancy in either breast.       Pathology:  8/8/2023 Stereotactic guided  Core Needle Biopsy Left Breast Central to Slightly Superior Medial Calcifications-        - Benign breast tissue with fibrocystic changes and associated microcalcifications     2. 2023 Stereotactic guided Core Needle Biopsy Left Breast Lateral Calcifications-      - Complex fibroadenoma with benign cystic changes and associated microcalcifications       -Atypical lobular hyperplasia, no evidence of malignancy     OB/GYN History:  Age at Menarche Onset: 12  Menopausal Status: perimenopausal, LMP: Patient's last menstrual period was 2023 (approximate).  Hysterectomy/Oophorectomy: NA, at age NA  Hormonal birth control (duration): Yes OCP (estrogen/progesterone).   Pregnancy History:   Age at first live birth: 30  Hormone Replacement Therapy: Yes, Estrogen and Progesterone  Patient did not breast feed.  Patient denies nipple discharge.   Patient denies to previous breast biopsy.   Patient denies to a personal history of breast cancer.     Other Relevant History:  Prior thoracic RT: none  Genetic testing: No  Ashkenazi Jainism descent: No     Family History:  Family History   Problem Relation Name Age of Onset    Arthritis Mother Go     Hearing loss Mother Go     Osteoarthritis Mother Go     Hypertension Father Timur     Hypertension Brother Jaime     Cancer Maternal Grandfather Rodrigo     Arthritis Maternal Grandmother Kyleigh     Heart disease Maternal Grandmother Kyleigh     Stroke Maternal Grandmother Kyleigh     Depression Paternal Grandmother Ada     Depression Brother Timur     Drug abuse Brother Timur         Past History:  Past Medical History:   Diagnosis Date    Cyst of bone of left hand     Middle Finger    Depression     Menopause     Menstrual symptom or sign 1983    Osteoporosis     Pregnancy     Rheumatoid arthritis, unspecified     Sjogren's syndrome     Thyroid disease         Past Surgical History:   Procedure Laterality Date    ADENOIDECTOMY  1985     SECTION      EYE  SURGERY  2007    [facility] surgery    REPAIR OF MENISCUS OF KNEE Left 05/01/2023    TONSILLECTOMY  1985    TUBAL LIGATION          Social History     Socioeconomic History    Marital status:    Tobacco Use    Smoking status: Former     Current packs/day: 0.50     Average packs/day: 0.5 packs/day for 15.0 years (7.5 ttl pk-yrs)     Types: Cigarettes     Passive exposure: Never    Smokeless tobacco: Never   Substance and Sexual Activity    Alcohol use: Yes     Alcohol/week: 4.0 standard drinks of alcohol     Types: 4 Glasses of wine per week     Comment: Occasionally    Drug use: Yes     Types: Marijuana     Comment: Medical, Gummies    Sexual activity: Yes     Partners: Male     Birth control/protection: Partner-Vasectomy, Post-menopausal        Body mass index is 28.27 kg/m².     Allergy/Medications:   Review of patient's allergies indicates:  No Known Allergies       Current Outpatient Medications:     cholecalciferol, vitamin D3, 125 mcg (5,000 unit) capsule, as directed Orally, Disp: , Rfl:     cycloSPORINE (RESTASIS) 0.05 % ophthalmic emulsion, Restasis 0.05 % eye drops in a dropperette  INSTILL 1 DROP IN BOTH EYES TWICE DAILY, Disp: , Rfl:     fluocinolone acetonide oiL 0.01 % Drop, Place into the right ear., Disp: , Rfl:     hydroxychloroquine (PLAQUENIL) 200 mg tablet, Take 200 mg by mouth 2 (two) times daily., Disp: , Rfl:     mv,calcium,min/iron/folic/vitK (MULTI FOR HER ORAL), as directed Orally, Disp: , Rfl:     thyroid, pork, (ARMOUR THYROID) 60 mg Tab, Rankin Thyroid 60 mg tablet  TAKE ONE TABLET BY MOUTH EVERY DAY, Disp: , Rfl:     tirzepatide 7.5 mg/0.5 mL PnIj, Inject 7.5 mg into the skin every 7 days., Disp: , Rfl:     valACYclovir (VALTREX) 500 MG tablet, Take 500 mg by mouth., Disp: , Rfl:        Review of Systems:  Review of Systems   Constitutional:  Negative for chills, fever, malaise/fatigue and weight loss.        Hot flashes - improved off Testosterone  Fatigue - improved off  "Testosterone   HENT:  Negative for sore throat.         Headaches   Eyes:  Negative for blurred vision and double vision.   Respiratory:  Negative for cough and shortness of breath.    Cardiovascular:  Negative for chest pain, palpitations and leg swelling.   Gastrointestinal:  Negative for abdominal pain, constipation, diarrhea, heartburn, nausea and vomiting.   Genitourinary:  Negative for dysuria, flank pain, frequency, hematuria and urgency.   Musculoskeletal:  Negative for back pain, joint pain and myalgias.   Neurological:  Negative for dizziness and headaches.   Endo/Heme/Allergies:  Does not bruise/bleed easily.   Psychiatric/Behavioral:  Negative for depression. The patient is not nervous/anxious.            Objective:     Vitals:  Blood pressure 102/70, pulse 65, temperature 98 °F (36.7 °C), temperature source Oral, resp. rate 18, height 5' 10" (1.778 m), weight 89.4 kg (197 lb), last menstrual period 02/01/2023, SpO2 100%.      Physical Exam:  Physical Exam  Constitutional:       General: She is not in acute distress.     Appearance: Normal appearance.   Cardiovascular:      Rate and Rhythm: Normal rate and regular rhythm.   Pulmonary:      Effort: Pulmonary effort is normal.      Breath sounds: Normal breath sounds.   Chest:   Breasts:     Right: Normal. No swelling, bleeding, inverted nipple, mass, nipple discharge, skin change or tenderness.      Left: Normal. No swelling, bleeding, inverted nipple, mass, nipple discharge, skin change or tenderness.   Abdominal:      General: Abdomen is flat.      Palpations: Abdomen is soft.   Musculoskeletal:         General: Normal range of motion.      Cervical back: Normal range of motion.   Lymphadenopathy:      Upper Body:      Right upper body: No axillary adenopathy.      Left upper body: No axillary adenopathy.   Skin:     General: Skin is warm.   Neurological:      General: No focal deficit present.      Mental Status: She is alert.   Psychiatric:         " Attention and Perception: Attention normal.         Mood and Affect: Mood normal.           Assessment and Plan:     Encounter Diagnoses   Name Primary?    Atypical lobular hyperplasia (ALH) of left breast Yes    At high risk for breast cancer     Breast cancer screening, high risk patient             The relative risk of developing an invasive cancer in women with atypical lobular hyperplasia/LCIS is approximately 7- to 11-fold higher than for women without these pathologies. The absolute risk is approximately 1 percent per year and appears to be lifelong.     STRATEGIES OF REDUCING BREAST CANCER RISK  Women with atypical hyperplasia (AH) or lobular carcinoma in situ (LCIS) should be counseled regarding breast cancer risk reduction strategies. Ongoing surveillance with yearly mammography and twice-yearly breast examinations is appropriate. Such women should stop taking oral contraceptives, avoid hormone replacement therapy, and make appropriate lifestyle and dietary changes.    Active surveillance -- Breast cancer surveillance is performed for all women known to be at an increased risk of breast cancer (eg, positive family history of breast cancer, AH, or LCIS), as well as those at population risk. Surveillance must last for the patient's lifetime, or until such time as the patient would not want to undertake treatment for breast cancer if one is found, because the increased risk of breast cancer persists indefinitely. We typically examine high-risk patients at six-month intervals and obtain annual mammograms. Guidelines from the National Comprehensive Cancer Network (NCCN) suggest an interval history and physical examination every 6 to 12 months and annual screening mammography.    Role of breast MRI -- Magnetic resonance imaging (MRI) of the breast is more sensitive than mammography in detecting invasive breast cancers in high-risk women, but it is less specific, especially for younger women. A large number of  unnecessary biopsies will be generated while finding cancer in only a small group of patients. Despite that, MRI can detect smaller cancers and more node-negative malignancies in high-risk women than other imaging modalities; however, there is no evidence for a reduction in mortality or improved disease-free survival from screening with MRI.   There are insufficient data to support annual screening with MRI for women who are of an average risk, or an intermediate risk, such as those with a biopsy revealing AH or LCIS.     Consequently, guidelines from major groups, including the American Cancer Society (ACS) and the NCCN, only integrate breast MRI into their recommendations for breast cancer surveillance in high-risk women (ie, an estimated lifetime risk of breast cancer greater than 20 to 25 percent, calculated using BRCAPRO or a similar model based on family history, rather than the Alyson model).     Risk Reducing Medications -- We offer endocrine therapy as risk reduction medicaton for women treated for high-risk breast lesions. The purpose is to prevent invasive breast cancer; risk reduction medications has not been shown to confer a survival advantage to patients with high-risk lesions. The options for treatment include selective estrogen receptor modulators and aromatase inhibitors. The choice of agents and duration of therapy are discussed separately.       [Source: Uptodate]    NCCN Information on Risk Reducing Medications         Plan:     Problem List Items Addressed This Visit       Atypical lobular hyperplasia (ALH) of left breast - Primary    Current Assessment & Plan   1. Recommend BL SC MG - due July 2025  2. Recommend BL Breast MRI - due Feb 2026  3. Discussed bone health - recommend a combination oral supplement which contains Calcium 600 mg and Vitamin D3 at least 20 mcg (800 IU).   A. Bone Density - due Feb 2026  4. Continue clinical follow-up after imaging in July          At high risk for breast  cancer     Other Visit Diagnoses         Breast cancer screening, high risk patient        Relevant Orders    Mammo Digital Screening Bilat w/ Wilmer (XPD)                    All of questions were answered    Amber Galvan MD  Breast Surgical Oncology     OFFICE VISIT CODING:    Non-face-to-face time included:  Yes Preparing to see the patient such as reviewing the patient record  Yes Obtaining and reviewing separately obtained history  Yes Independently interpreting results  Yes Documenting clinical information in electronic health record  No Ordering appropriate medications  Yes Ordering appropriate tests  Yes Ordering appropriate procedures (including follow-up)  Yes Referring and communicating with other health care professionals (not separately reported)  Yes Care Coordination (not separately reported)    Face-to-face time included:  Yes Performing a medically necessary appropriate history, examination, and/or evaluation  Yes Communicating results to the patient/family/caregiver  Yes Counseling and educating the patient/family/caregiver  Yes Answering patient/family/caregiver questions    Total Time: 41 minutes    Total time includes both face-to-face and non-face-to-face time personally spent by myself on the day of the visit.

## 2025-02-25 ENCOUNTER — OFFICE VISIT (OUTPATIENT)
Dept: SURGERY | Facility: CLINIC | Age: 55
End: 2025-02-25
Payer: COMMERCIAL

## 2025-02-25 VITALS
HEIGHT: 70 IN | WEIGHT: 197 LBS | TEMPERATURE: 98 F | DIASTOLIC BLOOD PRESSURE: 70 MMHG | SYSTOLIC BLOOD PRESSURE: 102 MMHG | RESPIRATION RATE: 18 BRPM | BODY MASS INDEX: 28.2 KG/M2 | OXYGEN SATURATION: 100 % | HEART RATE: 65 BPM

## 2025-02-25 DIAGNOSIS — Z12.39 BREAST CANCER SCREENING, HIGH RISK PATIENT: ICD-10-CM

## 2025-02-25 DIAGNOSIS — N60.92 ATYPICAL LOBULAR HYPERPLASIA (ALH) OF LEFT BREAST: Primary | ICD-10-CM

## 2025-02-25 DIAGNOSIS — Z91.89 AT HIGH RISK FOR BREAST CANCER: ICD-10-CM

## 2025-02-25 PROCEDURE — 3074F SYST BP LT 130 MM HG: CPT | Mod: CPTII,S$GLB,, | Performed by: SURGERY

## 2025-02-25 PROCEDURE — 3008F BODY MASS INDEX DOCD: CPT | Mod: CPTII,S$GLB,, | Performed by: SURGERY

## 2025-02-25 PROCEDURE — 99999 PR PBB SHADOW E&M-EST. PATIENT-LVL V: CPT | Mod: PBBFAC,,, | Performed by: SURGERY

## 2025-02-25 PROCEDURE — 1160F RVW MEDS BY RX/DR IN RCRD: CPT | Mod: CPTII,S$GLB,, | Performed by: SURGERY

## 2025-02-25 PROCEDURE — 3078F DIAST BP <80 MM HG: CPT | Mod: CPTII,S$GLB,, | Performed by: SURGERY

## 2025-02-25 PROCEDURE — 1159F MED LIST DOCD IN RCRD: CPT | Mod: CPTII,S$GLB,, | Performed by: SURGERY

## 2025-02-25 PROCEDURE — 99215 OFFICE O/P EST HI 40 MIN: CPT | Mod: S$GLB,,, | Performed by: SURGERY

## 2025-02-25 RX ORDER — HYDROXYCHLOROQUINE SULFATE 200 MG/1
200 TABLET, FILM COATED ORAL 2 TIMES DAILY
COMMUNITY

## 2025-02-25 NOTE — ASSESSMENT & PLAN NOTE
1. Recommend BL SC MG - due July 2025  2. Recommend BL Breast MRI - due Feb 2026  3. Discussed bone health - recommend a combination oral supplement which contains Calcium 600 mg and Vitamin D3 at least 20 mcg (800 IU).   A. Bone Density - due Feb 2026  4. Continue clinical follow-up after imaging in July

## 2025-04-17 ENCOUNTER — PATIENT OUTREACH (OUTPATIENT)
Dept: ADMINISTRATIVE | Facility: HOSPITAL | Age: 55
End: 2025-04-17
Payer: COMMERCIAL

## 2025-04-17 LAB — CRC RECOMMENDATION EXT: NORMAL

## 2025-08-08 ENCOUNTER — HOSPITAL ENCOUNTER (OUTPATIENT)
Dept: RADIOLOGY | Facility: HOSPITAL | Age: 55
Discharge: HOME | End: 2025-08-08
Attending: SURGERY
Payer: COMMERCIAL

## 2025-08-08 DIAGNOSIS — Z12.39 BREAST CANCER SCREENING, HIGH RISK PATIENT: ICD-10-CM

## 2025-08-08 PROCEDURE — 77063 BREAST TOMOSYNTHESIS BI: CPT | Mod: TC

## 2025-08-08 PROCEDURE — 77063 BREAST TOMOSYNTHESIS BI: CPT | Mod: 26,,, | Performed by: RADIOLOGY

## 2025-08-08 PROCEDURE — 77067 SCR MAMMO BI INCL CAD: CPT | Mod: 26,,, | Performed by: RADIOLOGY

## 2025-08-25 ENCOUNTER — OFFICE VISIT (OUTPATIENT)
Dept: SURGERY | Facility: CLINIC | Age: 55
End: 2025-08-25
Payer: COMMERCIAL

## 2025-08-25 VITALS
OXYGEN SATURATION: 98 % | WEIGHT: 192.81 LBS | DIASTOLIC BLOOD PRESSURE: 72 MMHG | TEMPERATURE: 98 F | HEIGHT: 70 IN | RESPIRATION RATE: 18 BRPM | BODY MASS INDEX: 27.6 KG/M2 | HEART RATE: 72 BPM | SYSTOLIC BLOOD PRESSURE: 103 MMHG

## 2025-08-25 DIAGNOSIS — Z91.89 AT HIGH RISK FOR BREAST CANCER: Primary | ICD-10-CM

## 2025-08-25 DIAGNOSIS — Z12.39 BREAST CANCER SCREENING, HIGH RISK PATIENT: ICD-10-CM

## 2025-08-25 DIAGNOSIS — Z12.31 ENCOUNTER FOR SCREENING MAMMOGRAM FOR BREAST CANCER: ICD-10-CM

## 2025-08-25 PROCEDURE — 1160F RVW MEDS BY RX/DR IN RCRD: CPT | Mod: CPTII,S$GLB,,

## 2025-08-25 PROCEDURE — 3074F SYST BP LT 130 MM HG: CPT | Mod: CPTII,S$GLB,,

## 2025-08-25 PROCEDURE — 1159F MED LIST DOCD IN RCRD: CPT | Mod: CPTII,S$GLB,,

## 2025-08-25 PROCEDURE — 99214 OFFICE O/P EST MOD 30 MIN: CPT | Mod: S$GLB,,,

## 2025-08-25 PROCEDURE — 99999 PR PBB SHADOW E&M-EST. PATIENT-LVL V: CPT | Mod: PBBFAC,,,

## 2025-08-25 PROCEDURE — 3078F DIAST BP <80 MM HG: CPT | Mod: CPTII,S$GLB,,

## 2025-08-25 PROCEDURE — 3008F BODY MASS INDEX DOCD: CPT | Mod: CPTII,S$GLB,,
